# Patient Record
Sex: FEMALE | Race: WHITE | URBAN - METROPOLITAN AREA
[De-identification: names, ages, dates, MRNs, and addresses within clinical notes are randomized per-mention and may not be internally consistent; named-entity substitution may affect disease eponyms.]

---

## 2023-10-15 DIAGNOSIS — O36.80X0 PREGNANCY WITH INCONCLUSIVE FETAL VIABILITY, SINGLE OR UNSPECIFIED FETUS: Primary | ICD-10-CM

## 2023-11-17 ENCOUNTER — ULTRASOUND ENCOUNTER (OUTPATIENT)
Facility: CLINIC | Age: 23
End: 2023-11-17
Payer: MEDICAID

## 2023-11-17 ENCOUNTER — TELEPHONE (OUTPATIENT)
Facility: CLINIC | Age: 23
End: 2023-11-17

## 2023-11-17 ENCOUNTER — LAB ENCOUNTER (OUTPATIENT)
Dept: LAB | Age: 23
End: 2023-11-17
Attending: OBSTETRICS & GYNECOLOGY
Payer: MEDICAID

## 2023-11-17 ENCOUNTER — INITIAL PRENATAL (OUTPATIENT)
Facility: CLINIC | Age: 23
End: 2023-11-17
Payer: MEDICAID

## 2023-11-17 VITALS
BODY MASS INDEX: 31.78 KG/M2 | HEART RATE: 76 BPM | WEIGHT: 157.63 LBS | HEIGHT: 59 IN | SYSTOLIC BLOOD PRESSURE: 112 MMHG | DIASTOLIC BLOOD PRESSURE: 72 MMHG

## 2023-11-17 DIAGNOSIS — Z34.81 PRENATAL CARE, SUBSEQUENT PREGNANCY IN FIRST TRIMESTER: ICD-10-CM

## 2023-11-17 DIAGNOSIS — O36.80X0 PREGNANCY WITH INCONCLUSIVE FETAL VIABILITY, SINGLE OR UNSPECIFIED FETUS: Primary | ICD-10-CM

## 2023-11-17 DIAGNOSIS — Z12.4 CERVICAL CANCER SCREENING: ICD-10-CM

## 2023-11-17 DIAGNOSIS — Z3A.10 10 WEEKS GESTATION OF PREGNANCY: ICD-10-CM

## 2023-11-17 PROBLEM — O30.049 DICHORIONIC DIAMNIOTIC TWIN PREGNANCY, ANTEPARTUM: Status: ACTIVE | Noted: 2023-11-17

## 2023-11-17 PROBLEM — O99.210 OBESITY AFFECTING PREGNANCY, ANTEPARTUM: Status: ACTIVE | Noted: 2023-11-17

## 2023-11-17 PROBLEM — O30.049 DICHORIONIC DIAMNIOTIC TWIN PREGNANCY, ANTEPARTUM (HCC): Status: ACTIVE | Noted: 2023-11-17

## 2023-11-17 PROBLEM — O99.210 OBESITY AFFECTING PREGNANCY, ANTEPARTUM (HCC): Status: ACTIVE | Noted: 2023-11-17

## 2023-11-17 LAB
ALBUMIN SERPL-MCNC: 3.4 G/DL (ref 3.4–5)
ALBUMIN/GLOB SERPL: 0.9 {RATIO} (ref 1–2)
ALP LIVER SERPL-CCNC: 83 U/L
ALT SERPL-CCNC: 15 U/L
ANION GAP SERPL CALC-SCNC: 7 MMOL/L (ref 0–18)
ANTIBODY SCREEN: NEGATIVE
APPEARANCE: CLEAR
AST SERPL-CCNC: 10 U/L (ref 15–37)
BASOPHILS # BLD AUTO: 0.03 X10(3) UL (ref 0–0.2)
BASOPHILS NFR BLD AUTO: 0.3 %
BILIRUB SERPL-MCNC: 0.2 MG/DL (ref 0.1–2)
BILIRUBIN: NEGATIVE
BUN BLD-MCNC: 6 MG/DL (ref 9–23)
CALCIUM BLD-MCNC: 8.7 MG/DL (ref 8.5–10.1)
CHLORIDE SERPL-SCNC: 108 MMOL/L (ref 98–112)
CO2 SERPL-SCNC: 23 MMOL/L (ref 21–32)
CREAT BLD-MCNC: 0.64 MG/DL
EGFRCR SERPLBLD CKD-EPI 2021: 126 ML/MIN/1.73M2 (ref 60–?)
EOSINOPHIL # BLD AUTO: 0.03 X10(3) UL (ref 0–0.7)
EOSINOPHIL NFR BLD AUTO: 0.3 %
ERYTHROCYTE [DISTWIDTH] IN BLOOD BY AUTOMATED COUNT: 13.4 %
GLOBULIN PLAS-MCNC: 4 G/DL (ref 2.8–4.4)
GLUCOSE (URINE DIPSTICK): NEGATIVE MG/DL
GLUCOSE BLD-MCNC: 78 MG/DL (ref 70–99)
HBV SURFACE AG SER-ACNC: <0.1 [IU]/L
HBV SURFACE AG SERPL QL IA: NONREACTIVE
HCT VFR BLD AUTO: 42.6 %
HCV AB SERPL QL IA: NONREACTIVE
HGB BLD-MCNC: 14.3 G/DL
IMM GRANULOCYTES # BLD AUTO: 0.03 X10(3) UL (ref 0–1)
IMM GRANULOCYTES NFR BLD: 0.3 %
KETONES (URINE DIPSTICK): NEGATIVE MG/DL
LEUKOCYTES: NEGATIVE
LYMPHOCYTES # BLD AUTO: 2.47 X10(3) UL (ref 1–4)
LYMPHOCYTES NFR BLD AUTO: 26.2 %
MCH RBC QN AUTO: 29.5 PG (ref 26–34)
MCHC RBC AUTO-ENTMCNC: 33.6 G/DL (ref 31–37)
MCV RBC AUTO: 88 FL
MONOCYTES # BLD AUTO: 0.62 X10(3) UL (ref 0.1–1)
MONOCYTES NFR BLD AUTO: 6.6 %
MULTISTIX LOT#: NORMAL NUMERIC
NEUTROPHILS # BLD AUTO: 6.23 X10 (3) UL (ref 1.5–7.7)
NEUTROPHILS # BLD AUTO: 6.23 X10(3) UL (ref 1.5–7.7)
NEUTROPHILS NFR BLD AUTO: 66.3 %
NITRITE, URINE: NEGATIVE
OCCULT BLOOD: NEGATIVE
OSMOLALITY SERPL CALC.SUM OF ELEC: 282 MOSM/KG (ref 275–295)
PH, URINE: 6.5 (ref 4.5–8)
PLATELET # BLD AUTO: 253 10(3)UL (ref 150–450)
POTASSIUM SERPL-SCNC: 4 MMOL/L (ref 3.5–5.1)
PROT SERPL-MCNC: 7.4 G/DL (ref 6.4–8.2)
PROTEIN (URINE DIPSTICK): NEGATIVE MG/DL
RBC # BLD AUTO: 4.84 X10(6)UL
RH BLOOD TYPE: POSITIVE
RUBV IGG SER QL: POSITIVE
RUBV IGG SER-ACNC: 59.1 IU/ML (ref 10–?)
SODIUM SERPL-SCNC: 138 MMOL/L (ref 136–145)
SPECIFIC GRAVITY: 1.02 (ref 1–1.03)
T PALLIDUM AB SER QL IA: NONREACTIVE
URINE-COLOR: YELLOW
UROBILINOGEN,SEMI-QN: 0.2 MG/DL (ref 0–1.9)
WBC # BLD AUTO: 9.4 X10(3) UL (ref 4–11)

## 2023-11-17 PROCEDURE — 3008F BODY MASS INDEX DOCD: CPT | Performed by: OBSTETRICS & GYNECOLOGY

## 2023-11-17 PROCEDURE — 87491 CHLMYD TRACH DNA AMP PROBE: CPT | Performed by: OBSTETRICS & GYNECOLOGY

## 2023-11-17 PROCEDURE — 87086 URINE CULTURE/COLONY COUNT: CPT | Performed by: OBSTETRICS & GYNECOLOGY

## 2023-11-17 PROCEDURE — 86762 RUBELLA ANTIBODY: CPT

## 2023-11-17 PROCEDURE — 87389 HIV-1 AG W/HIV-1&-2 AB AG IA: CPT

## 2023-11-17 PROCEDURE — 0500F INITIAL PRENATAL CARE VISIT: CPT | Performed by: OBSTETRICS & GYNECOLOGY

## 2023-11-17 PROCEDURE — 87077 CULTURE AEROBIC IDENTIFY: CPT | Performed by: OBSTETRICS & GYNECOLOGY

## 2023-11-17 PROCEDURE — 86803 HEPATITIS C AB TEST: CPT

## 2023-11-17 PROCEDURE — 87591 N.GONORRHOEAE DNA AMP PROB: CPT | Performed by: OBSTETRICS & GYNECOLOGY

## 2023-11-17 PROCEDURE — 88175 CYTOPATH C/V AUTO FLUID REDO: CPT | Performed by: OBSTETRICS & GYNECOLOGY

## 2023-11-17 PROCEDURE — 86850 RBC ANTIBODY SCREEN: CPT

## 2023-11-17 PROCEDURE — 36415 COLL VENOUS BLD VENIPUNCTURE: CPT

## 2023-11-17 PROCEDURE — 76801 OB US < 14 WKS SINGLE FETUS: CPT | Performed by: OBSTETRICS & GYNECOLOGY

## 2023-11-17 PROCEDURE — 3078F DIAST BP <80 MM HG: CPT | Performed by: OBSTETRICS & GYNECOLOGY

## 2023-11-17 PROCEDURE — 86901 BLOOD TYPING SEROLOGIC RH(D): CPT

## 2023-11-17 PROCEDURE — 83036 HEMOGLOBIN GLYCOSYLATED A1C: CPT

## 2023-11-17 PROCEDURE — 86900 BLOOD TYPING SEROLOGIC ABO: CPT

## 2023-11-17 PROCEDURE — 86780 TREPONEMA PALLIDUM: CPT

## 2023-11-17 PROCEDURE — 87186 SC STD MICRODIL/AGAR DIL: CPT | Performed by: OBSTETRICS & GYNECOLOGY

## 2023-11-17 PROCEDURE — 85025 COMPLETE CBC W/AUTO DIFF WBC: CPT

## 2023-11-17 PROCEDURE — 3074F SYST BP LT 130 MM HG: CPT | Performed by: OBSTETRICS & GYNECOLOGY

## 2023-11-17 PROCEDURE — 80053 COMPREHEN METABOLIC PANEL: CPT

## 2023-11-17 PROCEDURE — 87340 HEPATITIS B SURFACE AG IA: CPT

## 2023-11-17 PROCEDURE — 81002 URINALYSIS NONAUTO W/O SCOPE: CPT | Performed by: OBSTETRICS & GYNECOLOGY

## 2023-11-17 RX ORDER — CHOLECALCIFEROL (VITAMIN D3) 25 MCG
1 TABLET,CHEWABLE ORAL DAILY
COMMUNITY

## 2023-11-17 NOTE — PROGRESS NOTES
New OB  - patient has no complaints, denies h/o HSV, blood transfusion, hepatitis  - EDC by LMP c/w 10w1d US, planned pregnancy  - NIPT today    Di/Di twins  - L2U  - monthly growth 3rd trimester  - delivery 38 weeks    2.  Obesity  - discussed limit weight gain  - CMP, HbA1C ordered

## 2023-11-17 NOTE — TELEPHONE ENCOUNTER
8  Diamniotic dichorionic twin gestation    Pt request for NIPS lab draw per Dr. Phillip Raines     Patients name &  verified on lab tubes with patient. NIPS screen lab drawn, patient tolerated well. Specimen placed at . [facility] access, call in 2 weeks for result, Cautioned patient may receive results via email from [facility] that includes gender results discussed. Patient verbalized understanding.

## 2023-11-18 LAB
EST. AVERAGE GLUCOSE BLD GHB EST-MCNC: 105 MG/DL (ref 68–126)
HBA1C MFR BLD: 5.3 % (ref ?–5.7)

## 2023-11-20 LAB
C TRACH DNA SPEC QL NAA+PROBE: NEGATIVE
N GONORRHOEA DNA SPEC QL NAA+PROBE: NEGATIVE

## 2023-11-21 LAB
AMB EXT MYRIAD TRISOMY 13: NEGATIVE
AMB EXT MYRIAD TRISOMY 18: NEGATIVE
AMB EXT MYRIAD TRISOMY 21: NEGATIVE

## 2023-11-21 RX ORDER — NITROFURANTOIN 25; 75 MG/1; MG/1
100 CAPSULE ORAL 2 TIMES DAILY
Qty: 14 CAPSULE | Refills: 0 | Status: SHIPPED | OUTPATIENT
Start: 2023-11-21 | End: 2023-11-28

## 2023-11-23 LAB
.: NORMAL
.: NORMAL

## 2023-12-07 ENCOUNTER — TELEPHONE (OUTPATIENT)
Facility: CLINIC | Age: 23
End: 2023-12-07

## 2023-12-07 NOTE — TELEPHONE ENCOUNTER
Spoke with pt. Aware NIPT is negative(normal). Note to [facility] to add gender. Verbalized understanding.

## 2023-12-14 ENCOUNTER — ROUTINE PRENATAL (OUTPATIENT)
Facility: CLINIC | Age: 23
End: 2023-12-14
Payer: MEDICAID

## 2023-12-14 VITALS
WEIGHT: 160 LBS | DIASTOLIC BLOOD PRESSURE: 61 MMHG | BODY MASS INDEX: 32.25 KG/M2 | SYSTOLIC BLOOD PRESSURE: 98 MMHG | HEIGHT: 59 IN | HEART RATE: 78 BPM

## 2023-12-14 DIAGNOSIS — O30.042 DICHORIONIC DIAMNIOTIC TWIN PREGNANCY IN SECOND TRIMESTER: Primary | ICD-10-CM

## 2023-12-14 PROCEDURE — 3008F BODY MASS INDEX DOCD: CPT

## 2023-12-14 PROCEDURE — 3078F DIAST BP <80 MM HG: CPT

## 2023-12-14 PROCEDURE — 3074F SYST BP LT 130 MM HG: CPT

## 2023-12-14 PROCEDURE — 99212 OFFICE O/P EST SF 10 MIN: CPT

## 2023-12-15 NOTE — PROGRESS NOTES
ROBIN 14w0d    She is doing well, no complaints    Twin A 143  Twin B 153     EDC by LMP c/w 10w1d US, planned pregnancy  - NIPT negative     Di/Di twins  - L2U-discussed with patient, ordered today. - monthly growth 3rd trimester  - delivery 38 weeks     2.  Obesity  - discussed limit weight gain  - CMP, HbA1C - results are normal.

## 2024-01-19 ENCOUNTER — ROUTINE PRENATAL (OUTPATIENT)
Dept: OBGYN CLINIC | Facility: CLINIC | Age: 24
End: 2024-01-19
Payer: MEDICAID

## 2024-01-19 VITALS
WEIGHT: 165.38 LBS | HEIGHT: 59 IN | BODY MASS INDEX: 33.34 KG/M2 | SYSTOLIC BLOOD PRESSURE: 109 MMHG | DIASTOLIC BLOOD PRESSURE: 71 MMHG | HEART RATE: 84 BPM

## 2024-01-19 DIAGNOSIS — O30.042 DICHORIONIC DIAMNIOTIC TWIN PREGNANCY IN SECOND TRIMESTER: Primary | ICD-10-CM

## 2024-01-19 PROCEDURE — 3078F DIAST BP <80 MM HG: CPT

## 2024-01-19 PROCEDURE — 3008F BODY MASS INDEX DOCD: CPT

## 2024-01-19 PROCEDURE — 99213 OFFICE O/P EST LOW 20 MIN: CPT

## 2024-01-19 PROCEDURE — 3074F SYST BP LT 130 MM HG: CPT

## 2024-01-19 NOTE — PROGRESS NOTES
ROBIN 19w1d    She is doing well, no complaints.     Twin A maternal right 145  Twin B maternal lower left 138     EDC by LMP c/w 10w1d US, planned pregnancy  - NIPT negative     Di/Di twins  - Q7T-maj appointment 1/25/24  - growth US,  NSTs, per [facility] recommendations  - delivery 38 weeks     2. Obesity  - discussed limit weight gain  - CMP, HbA1C - results are normal.   -L2US, growths, and NSTs

## 2024-01-24 NOTE — PROGRESS NOTES
Outpatient Maternal-Fetal Medicine Consultation     Dear Dr. Patricia     Thank you for requesting ultrasound evaluation and maternal fetal medicine consultation on your patient Tana Kwong.  As you are aware she is a 23 year old female  with a twin pregnancy and an Estimated Date of Delivery: 24.  A maternal-fetal medicine consultation was requested secondary to  twin gestation .  Her prenatal records and labs were reviewed.     HISTORY  # 1 - Date: 10/10/15, Sex: Female, Weight: None, GA: 38w0d, Delivery: Normal spontaneous vaginal delivery, Apgar1: None, Apgar5: None, Living: Living, Birth Comments: None     # 2 - Date: 19, Sex: Female, Weight: None, GA: 38w0d, Delivery: Normal spontaneous vaginal delivery, Apgar1: None, Apgar5: None, Living: Living, Birth Comments: None     # 3 - Date: 22, Sex: Female, Weight: None, GA: 40w0d, Delivery: Normal spontaneous vaginal delivery, Apgar1: None, Apgar5: None, Living: Living, Birth Comments: None     # 4 - Date: None, Sex: None, Weight: None, GA: None, Delivery: None, Apgar1: None, Apgar5: None, Living: None, Birth Comments: None        Past Medical History  The patient  has no past medical history on file.     Past Surgical History  The patient  has no past surgical history on file.     Family History  The patient has no family status information on file.         Medications:   Current Outpatient Medications:     prenatal vitamin with DHA 27-0.8-228 MG Oral Cap, Take 1 capsule by mouth daily., Disp: , Rfl:   Allergies: No Known Allergies     PHYSICAL EXAMINATION:  /71 (BP Location: Right arm, Patient Position: Sitting, Cuff Size: adult)   Pulse 84   Ht 4' 11\" (1.499 m)   Wt 164 lb (74.4 kg)   LMP 2023 (Exact Date)   BMI 33.12 kg/m²   General: alert and oriented,no acute distress  Abdomen: gravid, soft, non-tender  Extremities: non-tender, no edema     OBSTETRIC ULTRASOUND     Twin A - IUP in transverse  presentation.    Placenta is posterior.   Cardiac activity is present, 141 bpm   g ( 0 lb 11 oz);   MVP is 6.6 cm.   SUB: 3VV, SVC/IVC    Twin A: The fetal measurements are consistent with the established EDC. No ultrasound evidence of structural abnormalities are seen today. The nasal bone is present. No ultrasound evidence of markers for aneuploidy are seen. She understands that ultrasound exam cannot exclude genetic abnormalities and that genetic testing is recommended. The limitations of ultrasound were discussed.    Twin B - IUP in transverse presentation.   Placenta is left lateral.  Cardiac activity is present, 144 bpm   g (0 lb 11 oz);   MVP is 5.6 cm.   SUB: 3 VV    Twin B:  The fetal measurements are consistent with the established EDC. No ultrasound evidence of structural abnormalities are seen today. The nasal bone is present. No ultrasound evidence of markers for aneuploidy are seen. She understands that ultrasound exam cannot exclude genetic abnormalities and that genetic testing is recommended. The limitations of ultrasound were discussed.    Discordance - 3.8 %    The cervix was not able to be clearly visualized and appeared short by transabdominal ultrasound, therefore transvaginal ultrasound was performed. Transvaginal US findings: Cervix is closed, long and no funneling seen measuring 49.8 mm      See PACS/Imaging Tab For Complete Ultrasound Report  I interpreted the results and reviewed them with the patient.     DISCUSSION  During her visit we discussed and reviewed the following issues:  DIAMNIOTIC DICHORIONIC TWIN GESTATION     Background:  Twins occur in approximately 1 in 80 in the [place].   Multiple pregnancies comprise an increasing proportion of the total pregnancies in the developed world due to older maternal age at childbirth and the expanded use of fertility treatments. In countries with high rates of multiple births, 30 to 50 percent of twin pregnancies occur  after infertility treatment.         Increased  Mortality of Twin Gestations    The mortality rate of infants is higher in multiple than ibrahim pregnancies because of the increased rates of prematurity, growth abnormalities, other obstetric complications, and congenital anomalies.  The incidence of single fetal death in twin pregnancies is 2.5 to 5.0 percent. In a retrospective study of 92 pregnancies, the incidence of single fetal death was much higher in monochorionic than diamniotic twins/triplets (26 versus 2.4 percent).      Birth with  Morbidity  The most serious risk of multiple gestations is spontaneous  delivery, which is associated with increased  mortality and short-term and long-term morbidity.  50% of twins are born  with average gestational age of delivery being 36 weeks.  The complications seen most frequently are hypothermia, respiratory abnormalities, patent ductus arteriosus, intracranial hemorrhage, hypoglycemia, necrotizing enterocolitis, infection, and retinopathy of prematurity.       IUGR  Growth restriction and discordant growth are frequent complications of multiple pregnancies and contribute to  morbidity and mortality.  IUGR is a major risk factor for increased  morbidity and mortality in twin pregnancies.        Congenital Anomalies  Higher rates of congenital anomalies contribute to adverse outcomes in multiple births. Monozygotic twins are two to three times more likely to have structural defects than dizygotic twins or singletons.     Psychosocial Stress Of Twin Gestations  First-time parents rarely appreciate the intensity of care that infants require. The emotional stress and complexity of caring for ibrahim newborns is magnified with multiples.  The risk of postpartum depression is increased.   Encourage assistance from additional caregivers such as other family members if available. Organizations of parents of  multiples may provide helpful coping strategies.        IMPRESSION:  IUP at 20w0d  Dichorionic diamniotic twin gestation     RECOMMENDATIONS:  Continue care with Dr. Patricia  Continue monthly growth ultrasounds.  Weekly NST's at 32 weeks.  Monitor for signs or symptoms of  labor, preeclampsia or fetal growth disturbances.  In the absence of other maternal or fetal indications, delivery advised at 38 weeks gestation.     Thank you for allowing me to participate in the care of your patient.  Please do not hesitate to contact me if additional questions or concerns arise.       Tyrel Lee M.D.     55 minutes spent in review of records, patient consultation, documentation and coordination of care.  The relevant clinical matter(s) are summarized above.      Note to patient and family  The 21st Century Cures Act makes medical notes available to patients in the interest of transparency.  However, please be advised that this is a medical document.  It is intended as rrgp-fr-nstc communication.  It is written and medical language may contain abbreviations or verbiage that are technical and unfamiliar.  It may appear blunt or direct.  Medical documents are intended to carry relevant information, facts as evident, and the clinical opinion of the practitioner.

## 2024-01-25 ENCOUNTER — ULTRASOUND ENCOUNTER (OUTPATIENT)
Dept: PERINATAL CARE | Facility: HOSPITAL | Age: 24
End: 2024-01-25
Attending: OBSTETRICS & GYNECOLOGY
Payer: MEDICAID

## 2024-01-25 ENCOUNTER — OFFICE VISIT (OUTPATIENT)
Dept: PERINATAL CARE | Facility: HOSPITAL | Age: 24
End: 2024-01-25
Payer: MEDICAID

## 2024-01-25 VITALS
HEIGHT: 59 IN | WEIGHT: 164 LBS | DIASTOLIC BLOOD PRESSURE: 71 MMHG | BODY MASS INDEX: 33.06 KG/M2 | SYSTOLIC BLOOD PRESSURE: 112 MMHG | HEART RATE: 84 BPM

## 2024-01-25 DIAGNOSIS — O30.049 TWIN GESTATION, DICHORIONIC DIAMNIOTIC: Primary | ICD-10-CM

## 2024-01-25 DIAGNOSIS — O30.042 DICHORIONIC DIAMNIOTIC TWIN PREGNANCY IN SECOND TRIMESTER: Primary | ICD-10-CM

## 2024-01-25 DIAGNOSIS — O30.042 DICHORIONIC DIAMNIOTIC TWIN PREGNANCY IN SECOND TRIMESTER: ICD-10-CM

## 2024-01-25 DIAGNOSIS — O30.049 TWIN GESTATION, DICHORIONIC DIAMNIOTIC: ICD-10-CM

## 2024-01-25 PROCEDURE — 76812 OB US DETAILED ADDL FETUS: CPT

## 2024-01-25 PROCEDURE — 76811 OB US DETAILED SNGL FETUS: CPT

## 2024-01-25 PROCEDURE — 76811 OB US DETAILED SNGL FETUS: CPT | Performed by: OBSTETRICS & GYNECOLOGY

## 2024-01-25 PROCEDURE — 76817 TRANSVAGINAL US OBSTETRIC: CPT

## 2024-01-25 NOTE — PROGRESS NOTES
Pt here for Level II Ultrasound/Di Di Twins  +fm both babies noted per patient  Pt denies complaints.

## 2024-02-16 ENCOUNTER — ROUTINE PRENATAL (OUTPATIENT)
Dept: OBGYN CLINIC | Facility: CLINIC | Age: 24
End: 2024-02-16
Payer: MEDICAID

## 2024-02-16 VITALS
HEIGHT: 59 IN | HEART RATE: 90 BPM | SYSTOLIC BLOOD PRESSURE: 101 MMHG | DIASTOLIC BLOOD PRESSURE: 66 MMHG | BODY MASS INDEX: 34.66 KG/M2 | WEIGHT: 171.94 LBS

## 2024-02-16 DIAGNOSIS — O30.042 DICHORIONIC DIAMNIOTIC TWIN PREGNANCY IN SECOND TRIMESTER: Primary | ICD-10-CM

## 2024-02-16 PROCEDURE — 99213 OFFICE O/P EST LOW 20 MIN: CPT

## 2024-02-16 NOTE — PROGRESS NOTES
ROBIN 23w1d    She is doing well, no complaints.   -1 hr GTT, CBC, 3rd tri HIV discussed - ordered today    Twin A fht 141 (maternal right)  Twin B fht 138 (maternal left)    EDC by LMP c/w 10w1d US, planned pregnancy  - NIPT negative     Di/Di twins  - L2U-normal anatomy x2, discordance 2.8%, cervix closed and no funneling noted.   - growth US monthly, NSTs starting at 32 wks, monitor for s/s of  labor, preeclampsia or fetal growth disturbances   - delivery 38 weeks     2. Obesity  - discussed limit weight gain  - CMP, HbA1C - results are normal.   -L2US, growths, and NSTs

## 2024-02-28 NOTE — PROGRESS NOTES
Outpatient Maternal-Fetal Medicine Follow-Up    Dear Dr. Patricia    Thank you for requesting ultrasound evaluation and maternal fetal medicine consultation on your patient Tana Kwong.  As you are aware she is a 24 year old female  with a twin pregnancy and an Estimated Date of Delivery: 24.  She returned to maternal-fetal medicine today for a follow-up visit.  Her history was reviewed from her prior visit and there were no interval changes.    Antepartum Risk Factors  Dichorionic diamniotic twin gestation     PHYSICAL EXAMINATION:  /67 (BP Location: Right arm, Patient Position: Sitting, Cuff Size: adult)   Pulse 79   Ht 4' 11\" (1.499 m)   Wt 174 lb (78.9 kg)   LMP 2023 (Exact Date)   BMI 35.14 kg/m²   General: alert and oriented, no acute distress  Abdomen: gravid, soft, non-tender  Extremities: non-tender, no edema    OBSTETRIC ULTRASOUND    Ultrasound findings:   Twin A - IUP in cephalic presentation.    Placenta is posterior.   Cardiac activity is present 143 bpm  EFW 1003 g ( 2 lb 3 oz); 65%.   MVP is 5.8 cm.     TWIN A: The fetal measurements are consistent with established EDC. No gross ultrasound evidence of structural abnormalities are seen today. The patient understands that ultrasound cannot rule out all structural and chromosomal abnormalities.    Twin B - IUP in transverse presentation.   Placenta is posterior.   Cardiac activity is present 124 bpm   g (1 lb 15 oz); 49%.   MVP is 4.7 cm.   TWIN B: The fetal measurements are consistent with established EDC. No gross ultrasound evidence of structural abnormalities are seen today. The patient understands that ultrasound cannot rule out all structural and chromosomal abnormalities.    Discordance - 12.6 %     See PACS/Imaging Tab For Complete Ultrasound Report  I interpreted the results and reviewed them with the patient.    DISCUSSION  During her visit we discussed and reviewed the following  issues:  DICHORIONIC DIAMNIOTIC TWIN GESTATION - follow-up  Appropriate interval growth is noted.  The patient denies any signs or symptoms of  labor. There is no clinical evidence of preeclampsia.  See follow-up recommendations below.    IMPRESSION:  IUP at 26w0d  Dichorionic diamniotic twin gestation     RECOMMENDATIONS:  Continue care with Dr. Patricia  Continue monthly growth ultrasounds.  Weekly NST's at 32 weeks.  Monitor for signs or symptoms of  labor, preeclampsia or fetal growth disturbances.  In the absence of other maternal or fetal indications, delivery advised at 38 weeks gestation.    Thank you for allowing me to participate in the care of your patient.  Please do not hesitate to contact me if additional questions or concerns arise.      Tyrel Lee M.D.    30 minutes spent in review of records, patient consultation, documentation and coordination of care.  The relevant clinical matter(s) are summarized above.     Note to patient and family  The 21st Century Cures Act makes medical notes available to patients in the interest of transparency.  However, please be advised that this is a medical document.  It is intended as emby-mx-qdcm communication.  It is written and medical language may contain abbreviations or verbiage that are technical and unfamiliar.  It may appear blunt or direct.  Medical documents are intended to carry relevant information, facts as evident, and the clinical opinion of the practitioner.

## 2024-03-07 ENCOUNTER — ULTRASOUND ENCOUNTER (OUTPATIENT)
Dept: PERINATAL CARE | Facility: HOSPITAL | Age: 24
End: 2024-03-07
Attending: OBSTETRICS & GYNECOLOGY
Payer: MEDICAID

## 2024-03-07 VITALS
SYSTOLIC BLOOD PRESSURE: 106 MMHG | BODY MASS INDEX: 35.08 KG/M2 | HEART RATE: 79 BPM | DIASTOLIC BLOOD PRESSURE: 67 MMHG | HEIGHT: 59 IN | WEIGHT: 174 LBS

## 2024-03-07 DIAGNOSIS — O30.049: ICD-10-CM

## 2024-03-07 DIAGNOSIS — O30.042 DICHORIONIC DIAMNIOTIC TWIN PREGNANCY IN SECOND TRIMESTER (HCC): Primary | ICD-10-CM

## 2024-03-07 DIAGNOSIS — O30.042 DICHORIONIC DIAMNIOTIC TWIN PREGNANCY IN SECOND TRIMESTER (HCC): ICD-10-CM

## 2024-03-07 DIAGNOSIS — O30.049: Primary | ICD-10-CM

## 2024-03-07 PROCEDURE — 76816 OB US FOLLOW-UP PER FETUS: CPT | Performed by: OBSTETRICS & GYNECOLOGY

## 2024-03-07 PROCEDURE — 76816 OB US FOLLOW-UP PER FETUS: CPT

## 2024-03-15 ENCOUNTER — LAB ENCOUNTER (OUTPATIENT)
Dept: LAB | Age: 24
End: 2024-03-15
Payer: MEDICAID

## 2024-03-15 ENCOUNTER — ROUTINE PRENATAL (OUTPATIENT)
Dept: OBGYN CLINIC | Facility: CLINIC | Age: 24
End: 2024-03-15
Payer: MEDICAID

## 2024-03-15 VITALS
DIASTOLIC BLOOD PRESSURE: 70 MMHG | SYSTOLIC BLOOD PRESSURE: 111 MMHG | HEIGHT: 59 IN | WEIGHT: 174.19 LBS | BODY MASS INDEX: 35.12 KG/M2 | HEART RATE: 83 BPM

## 2024-03-15 DIAGNOSIS — O30.042 DICHORIONIC DIAMNIOTIC TWIN PREGNANCY IN SECOND TRIMESTER (HCC): ICD-10-CM

## 2024-03-15 DIAGNOSIS — O30.042 DICHORIONIC DIAMNIOTIC TWIN PREGNANCY IN SECOND TRIMESTER (HCC): Primary | ICD-10-CM

## 2024-03-15 LAB
BASOPHILS # BLD AUTO: 0.02 X10(3) UL (ref 0–0.2)
BASOPHILS NFR BLD AUTO: 0.3 %
DEPRECATED HBV CORE AB SER IA-ACNC: 3.5 NG/ML
EOSINOPHIL # BLD AUTO: 0.03 X10(3) UL (ref 0–0.7)
EOSINOPHIL NFR BLD AUTO: 0.4 %
ERYTHROCYTE [DISTWIDTH] IN BLOOD BY AUTOMATED COUNT: 14.8 %
GLUCOSE 1H P GLC SERPL-MCNC: 129 MG/DL
HCT VFR BLD AUTO: 33.7 %
HGB BLD-MCNC: 10.6 G/DL
IMM GRANULOCYTES # BLD AUTO: 0.01 X10(3) UL (ref 0–1)
IMM GRANULOCYTES NFR BLD: 0.1 %
LYMPHOCYTES # BLD AUTO: 1.93 X10(3) UL (ref 1–4)
LYMPHOCYTES NFR BLD AUTO: 26.2 %
MCH RBC QN AUTO: 26.2 PG (ref 26–34)
MCHC RBC AUTO-ENTMCNC: 31.5 G/DL (ref 31–37)
MCV RBC AUTO: 83.2 FL
MONOCYTES # BLD AUTO: 0.53 X10(3) UL (ref 0.1–1)
MONOCYTES NFR BLD AUTO: 7.2 %
NEUTROPHILS # BLD AUTO: 4.85 X10 (3) UL (ref 1.5–7.7)
NEUTROPHILS # BLD AUTO: 4.85 X10(3) UL (ref 1.5–7.7)
NEUTROPHILS NFR BLD AUTO: 65.8 %
PLATELET # BLD AUTO: 227 10(3)UL (ref 150–450)
RBC # BLD AUTO: 4.05 X10(6)UL
WBC # BLD AUTO: 7.4 X10(3) UL (ref 4–11)

## 2024-03-15 PROCEDURE — 87389 HIV-1 AG W/HIV-1&-2 AB AG IA: CPT

## 2024-03-15 PROCEDURE — 82950 GLUCOSE TEST: CPT

## 2024-03-15 PROCEDURE — 85025 COMPLETE CBC W/AUTO DIFF WBC: CPT

## 2024-03-15 PROCEDURE — 99214 OFFICE O/P EST MOD 30 MIN: CPT

## 2024-03-15 PROCEDURE — 82728 ASSAY OF FERRITIN: CPT

## 2024-03-21 NOTE — PROGRESS NOTES
Outpatient Maternal-Fetal Medicine Follow-Up     Dear Dr. Patricia     Thank you for requesting ultrasound evaluation and maternal fetal medicine consultation on your patient Tana Kwong.  As you are aware she is a 24 year old female  with a twin pregnancy and an Estimated Date of Delivery: 24.  She returned to maternal-fetal medicine today for a follow-up visit.  Her history was reviewed from her prior visit and there were no interval changes.     Antepartum Risk Factors  Dichorionic diamniotic twin gestation      PHYSICAL EXAMINATION:  /67 (BP Location: Right arm, Patient Position: Sitting, Cuff Size: adult)   Pulse 77   Ht 4' 11\" (1.499 m)   Wt 175 lb (79.4 kg)   LMP 2023 (Exact Date)   BMI 35.35 kg/m²   General: alert and oriented, no acute distress  Abdomen: gravid, soft, non-tender  Extremities: non-tender, no edema     OBSTETRIC ULTRASOUND     Ultrasound findings:   Twin A - IUP in transverse presentation.    Placenta is posterior.   Cardiac activity is present 156 bpm  EFW 1549 g ( 3 lb 7 oz); 55%.   MVP is 6 cm.   TWIN A: The fetal measurements are consistent with established EDC. No gross ultrasound evidence of structural abnormalities are seen today. The patient understands that ultrasound cannot rule out all structural and chromosomal abnormalities.    Twin B - IUP in transverse presentation.   Placenta is posterior.   Cardiac activity is present 142 bpm  EFW 1384 g (3 lb 1 oz); 40%.   MVP is 4.4 cm.   TWIN B: The fetal measurements are consistent with established EDC. No gross ultrasound evidence of structural abnormalities are seen today. The patient understands that ultrasound cannot rule out all structural and chromosomal abnormalities.    Discordance - 10.7 %      See PACS/Imaging Tab For Complete Ultrasound Report  I interpreted the results and reviewed them with the patient.     DISCUSSION  During her visit we discussed and reviewed the following  issues:  DICHORIONIC DIAMNIOTIC TWIN GESTATION - follow-up  Appropriate interval growth is noted.  The patient denies any signs or symptoms of  labor. There is no clinical evidence of preeclampsia.  See follow-up recommendations below.     IMPRESSION:  IUP at 30w0d  Dichorionic diamniotic twin gestation      RECOMMENDATIONS:  Continue care with Dr. Patricia  Continue monthly growth ultrasounds.  Weekly NST's at 32 weeks.  Monitor for signs or symptoms of  labor, preeclampsia or fetal growth disturbances.  In the absence of other maternal or fetal indications, delivery advised at 38 weeks gestation.     Thank you for allowing me to participate in the care of your patient.  Please do not hesitate to contact me if additional questions or concerns arise.       Tyrel Lee M.D.     30 minutes spent in review of records, patient consultation, documentation and coordination of care.  The relevant clinical matter(s) are summarized above.      Note to patient and family  The 21st Century Cures Act makes medical notes available to patients in the interest of transparency.  However, please be advised that this is a medical document.  It is intended as gwhf-gf-anjk communication.  It is written and medical language may contain abbreviations or verbiage that are technical and unfamiliar.  It may appear blunt or direct.  Medical documents are intended to carry relevant information, facts as evident, and the clinical opinion of the practitioner.

## 2024-04-04 ENCOUNTER — ULTRASOUND ENCOUNTER (OUTPATIENT)
Dept: PERINATAL CARE | Facility: HOSPITAL | Age: 24
End: 2024-04-04
Attending: OBSTETRICS & GYNECOLOGY
Payer: MEDICAID

## 2024-04-04 VITALS
DIASTOLIC BLOOD PRESSURE: 67 MMHG | BODY MASS INDEX: 35.28 KG/M2 | SYSTOLIC BLOOD PRESSURE: 107 MMHG | HEART RATE: 77 BPM | HEIGHT: 59 IN | WEIGHT: 175 LBS

## 2024-04-04 DIAGNOSIS — O30.043 DICHORIONIC DIAMNIOTIC TWIN PREGNANCY IN THIRD TRIMESTER (HCC): Primary | ICD-10-CM

## 2024-04-04 DIAGNOSIS — O30.043 DICHORIONIC DIAMNIOTIC TWIN PREGNANCY IN THIRD TRIMESTER (HCC): ICD-10-CM

## 2024-04-04 PROCEDURE — 76816 OB US FOLLOW-UP PER FETUS: CPT | Performed by: OBSTETRICS & GYNECOLOGY

## 2024-04-07 PROBLEM — D50.9 MATERNAL IRON DEFICIENCY ANEMIA AFFECTING PREGNANCY IN THIRD TRIMESTER, ANTEPARTUM (HCC): Status: ACTIVE | Noted: 2024-04-07

## 2024-04-07 PROBLEM — O30.043 DICHORIONIC DIAMNIOTIC TWIN PREGNANCY IN THIRD TRIMESTER (HCC): Status: ACTIVE | Noted: 2023-11-17

## 2024-04-07 PROBLEM — E66.3 OVERWEIGHT (BMI 25.0-29.9): Status: ACTIVE | Noted: 2023-11-17

## 2024-04-07 PROBLEM — O99.013 MATERNAL IRON DEFICIENCY ANEMIA AFFECTING PREGNANCY IN THIRD TRIMESTER, ANTEPARTUM (HCC): Status: ACTIVE | Noted: 2024-04-07

## 2024-04-10 ENCOUNTER — TELEPHONE (OUTPATIENT)
Facility: CLINIC | Age: 24
End: 2024-04-10

## 2024-04-10 NOTE — TELEPHONE ENCOUNTER
Pt canceled her appointment on mychart for ROBIN. 4/8/24.   LVM for pt to please call to schedule appointment. Please advise.

## 2024-04-11 NOTE — TELEPHONE ENCOUNTER
Merlin with pt. Rescheduled ROBIN appointment for 4/17/24 with Dr. Rojas. Verbalized understanding.

## 2024-04-17 ENCOUNTER — ROUTINE PRENATAL (OUTPATIENT)
Facility: CLINIC | Age: 24
End: 2024-04-17
Payer: MEDICAID

## 2024-04-17 VITALS
HEIGHT: 59 IN | HEART RATE: 68 BPM | WEIGHT: 176.81 LBS | BODY MASS INDEX: 35.64 KG/M2 | DIASTOLIC BLOOD PRESSURE: 64 MMHG | SYSTOLIC BLOOD PRESSURE: 104 MMHG

## 2024-04-17 DIAGNOSIS — O99.013 MATERNAL IRON DEFICIENCY ANEMIA AFFECTING PREGNANCY IN THIRD TRIMESTER, ANTEPARTUM (HCC): ICD-10-CM

## 2024-04-17 DIAGNOSIS — O30.043 DICHORIONIC DIAMNIOTIC TWIN PREGNANCY IN THIRD TRIMESTER (HCC): Primary | ICD-10-CM

## 2024-04-17 DIAGNOSIS — Z23 NEED FOR TDAP VACCINATION: ICD-10-CM

## 2024-04-17 DIAGNOSIS — E66.3 OVERWEIGHT (BMI 25.0-29.9): ICD-10-CM

## 2024-04-17 DIAGNOSIS — D50.9 MATERNAL IRON DEFICIENCY ANEMIA AFFECTING PREGNANCY IN THIRD TRIMESTER, ANTEPARTUM (HCC): ICD-10-CM

## 2024-04-17 PROCEDURE — 99214 OFFICE O/P EST MOD 30 MIN: CPT | Performed by: OBSTETRICS & GYNECOLOGY

## 2024-04-17 PROCEDURE — 59025 FETAL NON-STRESS TEST: CPT | Performed by: OBSTETRICS & GYNECOLOGY

## 2024-04-17 NOTE — PROGRESS NOTES
ROBIN    +FM Doing well. No complaints     24 year old  at 31w6d  ANA MARIA 24 by LMP c/w 10w1d US  O+    -NIPS negative  -1 hr GTT & 3rd trim HIV done  -Tdap - encouraged. Can't give it to her in clinic due to Medicaid so will need to go to [facility] department.   -classes, pre-registration, pediatrician, breast pump, car seats discussed     Di/Di twins  -L2 US - normal   -Growth US monthly   3/7 - 26 wk 65%/49%. Discordance 12.6%  / - 30 wk: transverse 55%/transverse 40%. Discordance 10.7%  Next appt  - ordered already   -NSTs weekly at 32 wk  -Discussed overview of route of delivery planning with twin gestation including possible need for breech extraction for 2nd twin in vaginal delivery. Patient would like to attempt vaginal delivery only if both twins are vtx/vtx. If other presentation, would prefer CS.   -delivery at 38 wk      2. Overweight (pre preg BMI 29.9)   - weight gain goal 28-42 lb with twins  - baseline CMP, HbA1c - normal    3. Iron deficiency anemia  -23 Hb 14.3, ferritin 14.3 = initial prenatal labs   -3/15/24 Hb 10.6, ferritin 3.5   -PNV containing iron? Y  -Extra oral iron? Has been taking M,W,F    -can recheck Hb 1 month after starting extra oral iron or proceed to IV iron now. Because of twins & increased risk for  labor & delivery, recommend IV iron now.      RTC 1 wk with NST. Message to  staff sent.

## 2024-04-23 ENCOUNTER — TELEPHONE (OUTPATIENT)
Facility: CLINIC | Age: 24
End: 2024-04-23

## 2024-04-23 NOTE — TELEPHONE ENCOUNTER
Pt canceled NST appt on [employer] for today 04/23/2024, could not get in touch with pt to r/s. Pt does not have any future appts scheduled.

## 2024-04-24 NOTE — TELEPHONE ENCOUNTER
Attempted to call pt to reschedule missed ROBIN appointment. Unable to leave a message. No voicemail.

## 2024-04-25 NOTE — PROGRESS NOTES
Outpatient Maternal-Fetal Medicine Follow-Up     Dear Dr. Patricia     Thank you for requesting ultrasound evaluation and maternal fetal medicine consultation on your patient Tana Kwong.  As you are aware she is a 24 year old female  with a twin pregnancy and an Estimated Date of Delivery: 24.  She returned to maternal-fetal medicine today for a follow-up visit.  Her history was reviewed from her prior visit and there were no interval changes.     Antepartum Risk Factors  Dichorionic diamniotic twin gestation      PHYSICAL EXAMINATION:  /72 (BP Location: Right arm, Patient Position: Sitting, Cuff Size: adult)   Pulse 84   Ht 4' 11\" (1.499 m)   Wt 178 lb (80.7 kg)   LMP 2023 (Exact Date)   BMI 35.95 kg/m²   General: alert and oriented, no acute distress  Abdomen: gravid, soft, non-tender  Extremities: non-tender, no edema     OBSTETRIC ULTRASOUND     Ultrasound findings:   Twin A - IUP in breech presentation.    Placenta is posterior.   Cardiac activity is present, 122 bpm  EFW 2350 g ( 5 lb 3 oz); 47%.   MVP is 6.6 cm. BPP is 8/8.      TWIN A: The fetal measurements are consistent with established EDC. No gross ultrasound evidence of structural abnormalities are seen today. The patient understands that ultrasound cannot rule out all structural and chromosomal abnormalities.    Twin B - IUP in transverse presentation.   Placenta is posterior.  Cardiac activity is present, 134 bpm  EFW 2064 g (4 lb 9 oz); 26%.   MVP is 7 cm. BPP is 8/8.      TWIN B: The fetal measurements are consistent with established EDC. No gross ultrasound evidence of structural abnormalities are seen today. The patient understands that ultrasound cannot rule out all structural and chromosomal abnormalities.    Discordance - 12.1 %     See PACS/Imaging Tab For Complete Ultrasound Report  I interpreted the results and reviewed them with the patient.     DISCUSSION  During her visit we discussed  and reviewed the following issues:  DICHORIONIC DIAMNIOTIC TWIN GESTATION - follow-up  Appropriate interval growth is noted.  The patient denies any signs or symptoms of  labor. There is no clinical evidence of preeclampsia.  See follow-up recommendations below.     IMPRESSION:  IUP at 34w0d  Dichorionic diamniotic twin gestation      RECOMMENDATIONS:  Continue care with Dr. Patricia  Continue monthly growth ultrasounds.  Weekly NST's at 32 weeks.  Monitor for signs or symptoms of  labor, preeclampsia or fetal growth disturbances.  In the absence of other maternal or fetal indications, delivery advised at 38 weeks gestation.     Thank you for allowing me to participate in the care of your patient.  Please do not hesitate to contact me if additional questions or concerns arise.       Tyrel Lee M.D.     30 minutes spent in review of records, patient consultation, documentation and coordination of care.  The relevant clinical matter(s) are summarized above.      Note to patient and family  The 21st Century Cures Act makes medical notes available to patients in the interest of transparency.  However, please be advised that this is a medical document.  It is intended as cnrq-vi-pkba communication.  It is written and medical language may contain abbreviations or verbiage that are technical and unfamiliar.  It may appear blunt or direct.  Medical documents are intended to carry relevant information, facts as evident, and the clinical opinion of the practitioner.

## 2024-04-30 NOTE — TELEPHONE ENCOUNTER
ROBIN appt rescheduled for tomm 0900 EP. Patient verbalized understanding, agreed to and intend to comply with plan of care.

## 2024-05-02 ENCOUNTER — ULTRASOUND ENCOUNTER (OUTPATIENT)
Dept: PERINATAL CARE | Facility: HOSPITAL | Age: 24
End: 2024-05-02
Attending: OBSTETRICS & GYNECOLOGY
Payer: MEDICAID

## 2024-05-02 ENCOUNTER — LAB ENCOUNTER (OUTPATIENT)
Dept: LAB | Age: 24
End: 2024-05-02
Attending: STUDENT IN AN ORGANIZED HEALTH CARE EDUCATION/TRAINING PROGRAM
Payer: MEDICAID

## 2024-05-02 ENCOUNTER — ROUTINE PRENATAL (OUTPATIENT)
Dept: OBGYN CLINIC | Facility: CLINIC | Age: 24
End: 2024-05-02
Payer: MEDICAID

## 2024-05-02 VITALS
BODY MASS INDEX: 35.88 KG/M2 | DIASTOLIC BLOOD PRESSURE: 72 MMHG | SYSTOLIC BLOOD PRESSURE: 104 MMHG | HEART RATE: 84 BPM | HEIGHT: 59 IN | WEIGHT: 178 LBS

## 2024-05-02 VITALS
BODY MASS INDEX: 36 KG/M2 | HEART RATE: 96 BPM | WEIGHT: 178.56 LBS | DIASTOLIC BLOOD PRESSURE: 76 MMHG | SYSTOLIC BLOOD PRESSURE: 108 MMHG | HEIGHT: 59 IN

## 2024-05-02 DIAGNOSIS — O30.049: ICD-10-CM

## 2024-05-02 DIAGNOSIS — O30.043 DICHORIONIC DIAMNIOTIC TWIN PREGNANCY IN THIRD TRIMESTER (HCC): Primary | ICD-10-CM

## 2024-05-02 DIAGNOSIS — O30.043 DICHORIONIC DIAMNIOTIC TWIN PREGNANCY IN THIRD TRIMESTER (HCC): ICD-10-CM

## 2024-05-02 DIAGNOSIS — O30.049: Primary | ICD-10-CM

## 2024-05-02 DIAGNOSIS — O99.013 MATERNAL IRON DEFICIENCY ANEMIA AFFECTING PREGNANCY IN THIRD TRIMESTER, ANTEPARTUM (HCC): ICD-10-CM

## 2024-05-02 DIAGNOSIS — O99.210 OBESITY AFFECTING PREGNANCY (HCC): ICD-10-CM

## 2024-05-02 DIAGNOSIS — D50.9 MATERNAL IRON DEFICIENCY ANEMIA AFFECTING PREGNANCY IN THIRD TRIMESTER, ANTEPARTUM (HCC): ICD-10-CM

## 2024-05-02 DIAGNOSIS — Z34.90 PRENATAL CARE, ANTEPARTUM (HCC): ICD-10-CM

## 2024-05-02 LAB
BASOPHILS # BLD AUTO: 0.02 X10(3) UL (ref 0–0.2)
BASOPHILS NFR BLD AUTO: 0.3 %
EOSINOPHIL # BLD AUTO: 0.03 X10(3) UL (ref 0–0.7)
EOSINOPHIL NFR BLD AUTO: 0.5 %
ERYTHROCYTE [DISTWIDTH] IN BLOOD BY AUTOMATED COUNT: 17.7 %
HCT VFR BLD AUTO: 34.7 %
HGB BLD-MCNC: 10.5 G/DL
IMM GRANULOCYTES # BLD AUTO: 0.02 X10(3) UL (ref 0–1)
IMM GRANULOCYTES NFR BLD: 0.3 %
LYMPHOCYTES # BLD AUTO: 1.72 X10(3) UL (ref 1–4)
LYMPHOCYTES NFR BLD AUTO: 27 %
MCH RBC QN AUTO: 24 PG (ref 26–34)
MCHC RBC AUTO-ENTMCNC: 30.3 G/DL (ref 31–37)
MCV RBC AUTO: 79.2 FL
MONOCYTES # BLD AUTO: 0.53 X10(3) UL (ref 0.1–1)
MONOCYTES NFR BLD AUTO: 8.3 %
NEUTROPHILS # BLD AUTO: 4.05 X10 (3) UL (ref 1.5–7.7)
NEUTROPHILS # BLD AUTO: 4.05 X10(3) UL (ref 1.5–7.7)
NEUTROPHILS NFR BLD AUTO: 63.6 %
PLATELET # BLD AUTO: 195 10(3)UL (ref 150–450)
RBC # BLD AUTO: 4.38 X10(6)UL
T PALLIDUM AB SER QL IA: NONREACTIVE
WBC # BLD AUTO: 6.4 X10(3) UL (ref 4–11)

## 2024-05-02 PROCEDURE — 99212 OFFICE O/P EST SF 10 MIN: CPT | Performed by: STUDENT IN AN ORGANIZED HEALTH CARE EDUCATION/TRAINING PROGRAM

## 2024-05-02 PROCEDURE — 85025 COMPLETE CBC W/AUTO DIFF WBC: CPT

## 2024-05-02 PROCEDURE — 86780 TREPONEMA PALLIDUM: CPT

## 2024-05-02 PROCEDURE — 76816 OB US FOLLOW-UP PER FETUS: CPT | Performed by: OBSTETRICS & GYNECOLOGY

## 2024-05-02 PROCEDURE — 76819 FETAL BIOPHYS PROFIL W/O NST: CPT

## 2024-05-06 NOTE — PROGRESS NOTES
Message left to call back for results.    [facility] required for CPT codes  & [address]23. Ref #[id number]. Will fax order once signed.

## 2024-05-08 ENCOUNTER — TELEPHONE (OUTPATIENT)
Facility: CLINIC | Age: 24
End: 2024-05-08

## 2024-05-16 ENCOUNTER — TELEPHONE (OUTPATIENT)
Facility: CLINIC | Age: 24
End: 2024-05-16

## 2024-05-16 ENCOUNTER — HOSPITAL ENCOUNTER (INPATIENT)
Facility: HOSPITAL | Age: 24
LOS: 3 days | Discharge: HOME | End: 2024-05-19
Attending: OBSTETRICS & GYNECOLOGY | Admitting: OBSTETRICS & GYNECOLOGY

## 2024-05-16 ENCOUNTER — ANESTHESIA (OUTPATIENT)
Dept: OBGYN UNIT | Facility: HOSPITAL | Age: 24
End: 2024-05-16

## 2024-05-16 ENCOUNTER — ANESTHESIA EVENT (OUTPATIENT)
Dept: OBGYN UNIT | Facility: HOSPITAL | Age: 24
End: 2024-05-16

## 2024-05-16 DIAGNOSIS — Z98.891 STATUS POST PRIMARY LOW TRANSVERSE CESAREAN SECTION: Primary | ICD-10-CM

## 2024-05-16 PROBLEM — Z34.90 PREGNANCY (HCC): Status: ACTIVE | Noted: 2024-05-16

## 2024-05-16 LAB
BASOPHILS # BLD AUTO: 0.02 X10(3) UL (ref 0–0.2)
BASOPHILS NFR BLD AUTO: 0.3 %
EOSINOPHIL # BLD AUTO: 0.04 X10(3) UL (ref 0–0.7)
EOSINOPHIL NFR BLD AUTO: 0.6 %
ERYTHROCYTE [DISTWIDTH] IN BLOOD BY AUTOMATED COUNT: 18.2 %
HCT VFR BLD AUTO: 32.6 %
HGB BLD-MCNC: 10.1 G/DL
IMM GRANULOCYTES # BLD AUTO: 0.01 X10(3) UL (ref 0–1)
IMM GRANULOCYTES NFR BLD: 0.2 %
LYMPHOCYTES # BLD AUTO: 2.46 X10(3) UL (ref 1–4)
LYMPHOCYTES NFR BLD AUTO: 37.6 %
MCH RBC QN AUTO: 23.3 PG (ref 26–34)
MCHC RBC AUTO-ENTMCNC: 31 G/DL (ref 31–37)
MCV RBC AUTO: 75.3 FL
MONOCYTES # BLD AUTO: 0.46 X10(3) UL (ref 0.1–1)
MONOCYTES NFR BLD AUTO: 7 %
NEUTROPHILS # BLD AUTO: 3.55 X10 (3) UL (ref 1.5–7.7)
NEUTROPHILS # BLD AUTO: 3.55 X10(3) UL (ref 1.5–7.7)
NEUTROPHILS NFR BLD AUTO: 54.3 %
PLATELET # BLD AUTO: 190 10(3)UL (ref 150–450)
RBC # BLD AUTO: 4.33 X10(6)UL
WBC # BLD AUTO: 6.5 X10(3) UL (ref 4–11)

## 2024-05-16 PROCEDURE — 86900 BLOOD TYPING SEROLOGIC ABO: CPT | Performed by: STUDENT IN AN ORGANIZED HEALTH CARE EDUCATION/TRAINING PROGRAM

## 2024-05-16 PROCEDURE — 86780 TREPONEMA PALLIDUM: CPT | Performed by: STUDENT IN AN ORGANIZED HEALTH CARE EDUCATION/TRAINING PROGRAM

## 2024-05-16 PROCEDURE — 85025 COMPLETE CBC W/AUTO DIFF WBC: CPT | Performed by: STUDENT IN AN ORGANIZED HEALTH CARE EDUCATION/TRAINING PROGRAM

## 2024-05-16 PROCEDURE — 86850 RBC ANTIBODY SCREEN: CPT | Performed by: STUDENT IN AN ORGANIZED HEALTH CARE EDUCATION/TRAINING PROGRAM

## 2024-05-16 PROCEDURE — 86901 BLOOD TYPING SEROLOGIC RH(D): CPT | Performed by: STUDENT IN AN ORGANIZED HEALTH CARE EDUCATION/TRAINING PROGRAM

## 2024-05-16 PROCEDURE — 99214 OFFICE O/P EST MOD 30 MIN: CPT

## 2024-05-16 RX ORDER — CITRIC ACID/SODIUM CITRATE 334-500MG
30 SOLUTION, ORAL ORAL ONCE
Status: COMPLETED | OUTPATIENT
Start: 2024-05-16 | End: 2024-05-16

## 2024-05-16 RX ORDER — SODIUM CHLORIDE, SODIUM LACTATE, POTASSIUM CHLORIDE, CALCIUM CHLORIDE 600; 310; 30; 20 MG/100ML; MG/100ML; MG/100ML; MG/100ML
125 INJECTION, SOLUTION INTRAVENOUS CONTINUOUS
Status: DISCONTINUED | OUTPATIENT
Start: 2024-05-16 | End: 2024-05-17

## 2024-05-16 RX ORDER — ACETAMINOPHEN 500 MG
1000 TABLET ORAL ONCE
Status: COMPLETED | OUTPATIENT
Start: 2024-05-16 | End: 2024-05-16

## 2024-05-16 RX ORDER — ONDANSETRON 2 MG/ML
4 INJECTION INTRAMUSCULAR; INTRAVENOUS EVERY 6 HOURS PRN
Status: DISCONTINUED | OUTPATIENT
Start: 2024-05-16 | End: 2024-05-17

## 2024-05-17 PROBLEM — Z30.2 REQUEST FOR STERILIZATION: Status: ACTIVE | Noted: 2024-05-17

## 2024-05-17 PROBLEM — O42.919 PRETERM PREMATURE RUPTURE OF MEMBRANES (PPROM) DELIVERED, CURRENT HOSPITALIZATION (HCC): Status: ACTIVE | Noted: 2024-05-17

## 2024-05-17 PROBLEM — Z98.891 STATUS POST PRIMARY LOW TRANSVERSE CESAREAN SECTION: Status: ACTIVE | Noted: 2024-05-17

## 2024-05-17 LAB
ANTIBODY SCREEN: NEGATIVE
RH BLOOD TYPE: POSITIVE
T PALLIDUM AB SER QL IA: NONREACTIVE

## 2024-05-17 PROCEDURE — 88307 TISSUE EXAM BY PATHOLOGIST: CPT | Performed by: STUDENT IN AN ORGANIZED HEALTH CARE EDUCATION/TRAINING PROGRAM

## 2024-05-17 PROCEDURE — 0UB70ZZ EXCISION OF BILATERAL FALLOPIAN TUBES, OPEN APPROACH: ICD-10-PCS | Performed by: STUDENT IN AN ORGANIZED HEALTH CARE EDUCATION/TRAINING PROGRAM

## 2024-05-17 PROCEDURE — 88302 TISSUE EXAM BY PATHOLOGIST: CPT | Performed by: STUDENT IN AN ORGANIZED HEALTH CARE EDUCATION/TRAINING PROGRAM

## 2024-05-17 RX ORDER — HYDROMORPHONE HYDROCHLORIDE 1 MG/ML
0.4 INJECTION, SOLUTION INTRAMUSCULAR; INTRAVENOUS; SUBCUTANEOUS EVERY 2 HOUR PRN
Status: ACTIVE | OUTPATIENT
Start: 2024-05-17 | End: 2024-05-18

## 2024-05-17 RX ORDER — ONDANSETRON 2 MG/ML
4 INJECTION INTRAMUSCULAR; INTRAVENOUS EVERY 6 HOURS PRN
Status: DISCONTINUED | OUTPATIENT
Start: 2024-05-17 | End: 2024-05-19

## 2024-05-17 RX ORDER — KETOROLAC TROMETHAMINE 30 MG/ML
30 INJECTION, SOLUTION INTRAMUSCULAR; INTRAVENOUS EVERY 6 HOURS
Qty: 4 ML | Refills: 0 | Status: DISPENSED | OUTPATIENT
Start: 2024-05-17 | End: 2024-05-18

## 2024-05-17 RX ORDER — ACETAMINOPHEN 500 MG
1000 TABLET ORAL EVERY 6 HOURS
Status: DISCONTINUED | OUTPATIENT
Start: 2024-05-17 | End: 2024-05-19

## 2024-05-17 RX ORDER — DIPHENHYDRAMINE HYDROCHLORIDE 50 MG/ML
12.5 INJECTION INTRAMUSCULAR; INTRAVENOUS EVERY 4 HOURS PRN
Status: DISCONTINUED | OUTPATIENT
Start: 2024-05-17 | End: 2024-05-19

## 2024-05-17 RX ORDER — CARBOPROST TROMETHAMINE 250 UG/ML
250 INJECTION, SOLUTION INTRAMUSCULAR ONCE AS NEEDED
Status: ACTIVE | OUTPATIENT
Start: 2024-05-17 | End: 2024-05-17

## 2024-05-17 RX ORDER — SIMETHICONE 80 MG
80 TABLET,CHEWABLE ORAL 3 TIMES DAILY PRN
Status: DISCONTINUED | OUTPATIENT
Start: 2024-05-17 | End: 2024-05-19

## 2024-05-17 RX ORDER — MISOPROSTOL 200 UG/1
1000 TABLET ORAL ONCE AS NEEDED
Status: ACTIVE | OUTPATIENT
Start: 2024-05-17 | End: 2024-05-17

## 2024-05-17 RX ORDER — METOCLOPRAMIDE HYDROCHLORIDE 5 MG/ML
INJECTION INTRAMUSCULAR; INTRAVENOUS AS NEEDED
Status: DISCONTINUED | OUTPATIENT
Start: 2024-05-17 | End: 2024-05-17 | Stop reason: SURG

## 2024-05-17 RX ORDER — ONDANSETRON 2 MG/ML
4 INJECTION INTRAMUSCULAR; INTRAVENOUS ONCE AS NEEDED
Status: DISCONTINUED | OUTPATIENT
Start: 2024-05-17 | End: 2024-05-17 | Stop reason: HOSPADM

## 2024-05-17 RX ORDER — NALOXONE HYDROCHLORIDE 0.4 MG/ML
0.08 INJECTION, SOLUTION INTRAMUSCULAR; INTRAVENOUS; SUBCUTANEOUS
Status: ACTIVE | OUTPATIENT
Start: 2024-05-17 | End: 2024-05-18

## 2024-05-17 RX ORDER — MORPHINE SULFATE 2 MG/ML
INJECTION, SOLUTION INTRAMUSCULAR; INTRAVENOUS AS NEEDED
Status: DISCONTINUED | OUTPATIENT
Start: 2024-05-17 | End: 2024-05-17 | Stop reason: SURG

## 2024-05-17 RX ORDER — IBUPROFEN 600 MG/1
600 TABLET ORAL EVERY 6 HOURS
Status: DISCONTINUED | OUTPATIENT
Start: 2024-05-18 | End: 2024-05-19

## 2024-05-17 RX ORDER — DEXAMETHASONE SODIUM PHOSPHATE 4 MG/ML
VIAL (ML) INJECTION AS NEEDED
Status: DISCONTINUED | OUTPATIENT
Start: 2024-05-17 | End: 2024-05-17 | Stop reason: SURG

## 2024-05-17 RX ORDER — NALBUPHINE HYDROCHLORIDE 10 MG/ML
2.5 INJECTION, SOLUTION INTRAMUSCULAR; INTRAVENOUS; SUBCUTANEOUS EVERY 4 HOURS PRN
Status: DISCONTINUED | OUTPATIENT
Start: 2024-05-17 | End: 2024-05-19

## 2024-05-17 RX ORDER — DOCUSATE SODIUM 100 MG/1
100 CAPSULE, LIQUID FILLED ORAL
Status: DISCONTINUED | OUTPATIENT
Start: 2024-05-17 | End: 2024-05-19

## 2024-05-17 RX ORDER — KETOROLAC TROMETHAMINE 30 MG/ML
INJECTION, SOLUTION INTRAMUSCULAR; INTRAVENOUS
Status: COMPLETED
Start: 2024-05-17 | End: 2024-05-17

## 2024-05-17 RX ORDER — BUPIVACAINE HYDROCHLORIDE 7.5 MG/ML
INJECTION, SOLUTION INTRASPINAL AS NEEDED
Status: DISCONTINUED | OUTPATIENT
Start: 2024-05-17 | End: 2024-05-17 | Stop reason: SURG

## 2024-05-17 RX ORDER — HYDROMORPHONE HYDROCHLORIDE 1 MG/ML
0.4 INJECTION, SOLUTION INTRAMUSCULAR; INTRAVENOUS; SUBCUTANEOUS EVERY 5 MIN PRN
Status: DISCONTINUED | OUTPATIENT
Start: 2024-05-17 | End: 2024-05-17 | Stop reason: HOSPADM

## 2024-05-17 RX ORDER — DIPHENHYDRAMINE HCL 25 MG
25 CAPSULE ORAL EVERY 4 HOURS PRN
Status: DISCONTINUED | OUTPATIENT
Start: 2024-05-17 | End: 2024-05-19

## 2024-05-17 RX ORDER — TRANEXAMIC ACID 10 MG/ML
1000 INJECTION, SOLUTION INTRAVENOUS ONCE AS NEEDED
Status: ACTIVE | OUTPATIENT
Start: 2024-05-17 | End: 2024-05-17

## 2024-05-17 RX ORDER — GABAPENTIN 300 MG/1
300 CAPSULE ORAL EVERY 8 HOURS PRN
Status: DISCONTINUED | OUTPATIENT
Start: 2024-05-17 | End: 2024-05-19

## 2024-05-17 RX ORDER — ENOXAPARIN SODIUM 100 MG/ML
40 INJECTION SUBCUTANEOUS DAILY
Status: DISCONTINUED | OUTPATIENT
Start: 2024-05-17 | End: 2024-05-19

## 2024-05-17 RX ORDER — OXYCODONE HYDROCHLORIDE 5 MG/1
5 TABLET ORAL EVERY 4 HOURS PRN
Status: DISCONTINUED | OUTPATIENT
Start: 2024-05-17 | End: 2024-05-19

## 2024-05-17 RX ORDER — NALBUPHINE HYDROCHLORIDE 10 MG/ML
2.5 INJECTION, SOLUTION INTRAMUSCULAR; INTRAVENOUS; SUBCUTANEOUS
Status: DISCONTINUED | OUTPATIENT
Start: 2024-05-17 | End: 2024-05-17 | Stop reason: HOSPADM

## 2024-05-17 RX ORDER — BISACODYL 10 MG
10 SUPPOSITORY, RECTAL RECTAL ONCE AS NEEDED
Status: DISCONTINUED | OUTPATIENT
Start: 2024-05-17 | End: 2024-05-19

## 2024-05-17 RX ORDER — METHYLERGONOVINE MALEATE 0.2 MG/ML
0.2 INJECTION INTRAVENOUS ONCE AS NEEDED
Status: ACTIVE | OUTPATIENT
Start: 2024-05-17 | End: 2024-05-17

## 2024-05-17 RX ORDER — KETOROLAC TROMETHAMINE 30 MG/ML
30 INJECTION, SOLUTION INTRAMUSCULAR; INTRAVENOUS ONCE
Status: COMPLETED | OUTPATIENT
Start: 2024-05-17 | End: 2024-05-17

## 2024-05-17 RX ORDER — SODIUM CHLORIDE, SODIUM LACTATE, POTASSIUM CHLORIDE, CALCIUM CHLORIDE 600; 310; 30; 20 MG/100ML; MG/100ML; MG/100ML; MG/100ML
INJECTION, SOLUTION INTRAVENOUS CONTINUOUS
Status: DISCONTINUED | OUTPATIENT
Start: 2024-05-17 | End: 2024-05-19

## 2024-05-17 RX ORDER — ONDANSETRON 2 MG/ML
INJECTION INTRAMUSCULAR; INTRAVENOUS AS NEEDED
Status: DISCONTINUED | OUTPATIENT
Start: 2024-05-17 | End: 2024-05-17 | Stop reason: SURG

## 2024-05-17 RX ORDER — HYDROMORPHONE HYDROCHLORIDE 1 MG/ML
0.3 INJECTION, SOLUTION INTRAMUSCULAR; INTRAVENOUS; SUBCUTANEOUS EVERY 2 HOUR PRN
Status: DISCONTINUED | OUTPATIENT
Start: 2024-05-17 | End: 2024-05-19

## 2024-05-17 RX ORDER — HYDROMORPHONE HYDROCHLORIDE 1 MG/ML
0.2 INJECTION, SOLUTION INTRAMUSCULAR; INTRAVENOUS; SUBCUTANEOUS EVERY 5 MIN PRN
Status: DISCONTINUED | OUTPATIENT
Start: 2024-05-17 | End: 2024-05-17 | Stop reason: HOSPADM

## 2024-05-17 RX ORDER — DEXTROSE, SODIUM CHLORIDE, SODIUM LACTATE, POTASSIUM CHLORIDE, AND CALCIUM CHLORIDE 5; .6; .31; .03; .02 G/100ML; G/100ML; G/100ML; G/100ML; G/100ML
INJECTION, SOLUTION INTRAVENOUS CONTINUOUS PRN
Status: DISCONTINUED | OUTPATIENT
Start: 2024-05-17 | End: 2024-05-19

## 2024-05-17 RX ADMIN — DEXAMETHASONE SODIUM PHOSPHATE 4 MG: 4 MG/ML VIAL (ML) INJECTION at 00:24:00

## 2024-05-17 RX ADMIN — SODIUM CHLORIDE, SODIUM LACTATE, POTASSIUM CHLORIDE, CALCIUM CHLORIDE: 600; 310; 30; 20 INJECTION, SOLUTION INTRAVENOUS at 00:10:00

## 2024-05-17 RX ADMIN — METOCLOPRAMIDE HYDROCHLORIDE 10 MG: 5 INJECTION INTRAMUSCULAR; INTRAVENOUS at 00:24:00

## 2024-05-17 RX ADMIN — SODIUM CHLORIDE, SODIUM LACTATE, POTASSIUM CHLORIDE, CALCIUM CHLORIDE: 600; 310; 30; 20 INJECTION, SOLUTION INTRAVENOUS at 01:24:00

## 2024-05-17 RX ADMIN — ONDANSETRON 4 MG: 2 INJECTION INTRAMUSCULAR; INTRAVENOUS at 00:24:00

## 2024-05-17 RX ADMIN — BUPIVACAINE HYDROCHLORIDE 1.6 ML: 7.5 INJECTION, SOLUTION INTRASPINAL at 00:20:00

## 2024-05-17 RX ADMIN — MORPHINE SULFATE 0.2 MG: 2 INJECTION, SOLUTION INTRAMUSCULAR; INTRAVENOUS at 00:20:00

## 2024-05-17 NOTE — PLAN OF CARE
Problem: BIRTH - VAGINAL/ SECTION  Goal: Fetal and maternal status remain reassuring during the birth process  Description: INTERVENTIONS:  - Monitor vital signs  - Monitor fetal heart rate  - Monitor uterine activity  - Monitor labor progression (vaginal delivery)  - DVT prophylaxis (C/S delivery)  - Surgical antibiotic prophylaxis (C/S delivery)  Outcome: Progressing     Problem: PAIN - ADULT  Goal: Verbalizes/displays adequate comfort level or patient's stated pain goal  Description: INTERVENTIONS:  - Encourage pt to monitor pain and request assistance  - Assess pain using appropriate pain scale  - Administer analgesics based on type and severity of pain and evaluate response  - Implement non-pharmacological measures as appropriate and evaluate response  - Consider cultural and social influences on pain and pain management  - Manage/alleviate anxiety  - Utilize distraction and/or relaxation techniques  - Monitor for opioid side effects  - Notify MD/LIP if interventions unsuccessful or patient reports new pain  - Anticipate increased pain with activity and pre-medicate as appropriate  Outcome: Progressing     Problem: ANXIETY  Goal: Will report anxiety at manageable levels  Description: INTERVENTIONS:  - Administer medication as ordered  - Teach and rehearse alternative coping skills  - Provide emotional support with 1:1 interaction with staff  Outcome: Progressing     Problem: Patient/Family Goals  Goal: Patient/Family Long Term Goal  Description: Patient's Long Term Goal: adequate pain management    Interventions:  - follow prescribed POC  - See additional Care Plan goals for specific interventions  Outcome: Progressing  Goal: Patient/Family Short Term Goal  Description: Patient's Short Term Goal: safe surgical procedure    Interventions:   - follow prescribed POC  - See additional Care Plan goals for specific interventions  Outcome: Progressing

## 2024-05-17 NOTE — H&P
[facility]  Obstetrics and Gynecology  History & Physical    Tana Kwong Patient Status:  Inpatient    2/3/2000 MRN [id number]   Location [facility] LABOR & DELIVERY Attending Chanel Patricia DO   Hospital Day 0 PCP None Pcp     CC: Patient is here for PPROM, twins, plan for C section    SUBJECTIVE:    Tana Kwong is a 24 year old  female at 36w0d   Her current obstetrical history is significant for di/di twin pregnancy, fe deficiency anemia.     Membranes ruptured earlier this evening, pt not uncomfortable with contractions  Pt also is asking about permanent sterilization with C section. She is certain that she does not want to have any more children and would like bilateral salpingectomy      ANA MARIA Confirmation  LMP: Patient's last menstrual period was 2023 (exact date).  ANA MARIA: 2024, by Last Menstrual Period       Obstetric History:   OB History    Para Term  AB Living   4 3 3     3   SAB IAB Ectopic Multiple Live Births           3      # Outcome Date GA Lbr Jh/2nd Weight Sex Type Anes PTL Lv   4 Current            3 Term 22 40w0d   F NORMAL SPONT   JUAN   2 Term 19 38w0d   F NORMAL SPONT   JUAN   1 Term 10/10/15 38w0d   F NORMAL SPONT   JUAN     Past Medical History: History reviewed. No pertinent past medical history.  Past Social History: History reviewed. No pertinent surgical history.  Family History: No family history on file.  Social History:   Social History     Tobacco Use    Smoking status: Never    Smokeless tobacco: Never   Substance Use Topics    Alcohol use: Not Currently       Home Meds:   Medications Prior to Admission   Medication Sig Dispense Refill Last Dose    prenatal vitamin with DHA 27-0.8-228 MG Oral Cap Take 1 capsule by mouth daily.   2024     Allergies: No Known Allergies    OBJECTIVE:    Temp:  [98 °F (36.7 °C)] 98 °F (36.7 °C)  Pulse:  [80] 80  Resp:  [18] 18  BP: (123)/(73)  123/73  Body mass index is 34.74 kg/m².    General: NAD  FHT: twin A 120s/mod/+acc/no decels; twin B 130s/mod/+acc/no decels  TOCO: q 3-6 minutes    SVE: 4cm per RN  Grossly ruptured    Bedside US: Twin A breech, twin B transverse    Prenatal Labs Brief Review   Blood Type:   Lab Results   Component Value Date    ABO O 2023    RH Positive 2023     GBS:  N/A      Inpatient labs:  Lab Results   Component Value Date    WBC 6.5 2024    HGB 10.1 2024    HCT 32.6 2024    .0 2024       ASSESSMENT/ PLAN:    Tana Kwong is a 24 year old  female at 36w0d Estimated Date of Delivery: 24 who is being admitted for PPROM with di/di twin gestation. Presenting twin breech so recommended  delivery.    Patient would like permanent sterilization, discussed risks/benefits of bilateral salpingectomy along with primary C section, pt would like to proceed    Risks, benefits, alternatives and possible complications have been discussed in detail with the patient.  Pre-admission, admission, and post admission procedures and expectations were discussed in detail.  All questions answered, all appropriate consents will be signed at the Hospital. Admission is planned for today.    Siria Mcnulty MD

## 2024-05-17 NOTE — ANESTHESIA POSTPROCEDURE EVALUATION
[facility]    Tana Kwong Patient Status:  Inpatient   Age/Gender 24 year old female MRN [id number]   Location [facility] LABOR & DELIVERY Attending Chanel Patricia DO   Hosp Day # 1 PCP None Pcp       Anesthesia Post-op Note     SECTION with Bilateral Salpingectomy    Procedure Summary       Date: 24 Room / Location:  L+D OR   L+D OR    Anesthesia Start: 8 Anesthesia Stop: 124    Procedures:        SECTION with Bilateral Salpingectomy      TUBAL LIGATION Diagnosis:     Surgeons: Siria Mcnulty MD Anesthesiologist: Wilfred Anguiano MD    Anesthesia Type: spinal ASA Status: 2 - Emergent            Anesthesia Type: spinal    Vitals Value Taken Time   BP 97/64 24 0124   Temp 98 °F (36.7 °C) 24 0124   Pulse 88 24 0124   Resp 20 24 0124   SpO2 99 % 24 0124       Patient Location: Labor and Delivery    Anesthesia Type: spinal    Airway Patency: patent    Postop Pain Control: adequate    Mental Status: preanesthetic baseline    Nausea/Vomiting: none    Cardiopulmonary/Hydration status: stable euvolemic    Complications: no apparent anesthesia related complications    Postop vital signs: stable    Dental Exam: Unchanged from Preop    Patient to be discharged from PACU when criteria met.

## 2024-05-17 NOTE — PLAN OF CARE
Problem: BIRTH - VAGINAL/ SECTION  Goal: Fetal and maternal status remain reassuring during the birth process  Description: INTERVENTIONS:  - Monitor vital signs  - Monitor fetal heart rate  - Monitor uterine activity  - Monitor labor progression (vaginal delivery)  - DVT prophylaxis (C/S delivery)  - Surgical antibiotic prophylaxis (C/S delivery)  Outcome: Completed     Problem: PAIN - ADULT  Goal: Verbalizes/displays adequate comfort level or patient's stated pain goal  Description: INTERVENTIONS:  - Encourage pt to monitor pain and request assistance  - Assess pain using appropriate pain scale  - Administer analgesics based on type and severity of pain and evaluate response  - Implement non-pharmacological measures as appropriate and evaluate response  - Consider cultural and social influences on pain and pain management  - Manage/alleviate anxiety  - Utilize distraction and/or relaxation techniques  - Monitor for opioid side effects  - Notify MD/LIP if interventions unsuccessful or patient reports new pain  - Anticipate increased pain with activity and pre-medicate as appropriate  Outcome: Completed     Problem: ANXIETY  Goal: Will report anxiety at manageable levels  Description: INTERVENTIONS:  - Administer medication as ordered  - Teach and rehearse alternative coping skills  - Provide emotional support with 1:1 interaction with staff  Outcome: Completed     Problem: Patient/Family Goals  Goal: Patient/Family Long Term Goal  Description: Patient's Long Term Goal: adequate pain management    Interventions:  - follow prescribed POC  - See additional Care Plan goals for specific interventions  Outcome: Completed  Goal: Patient/Family Short Term Goal  Description: Patient's Short Term Goal: safe surgical procedure    Interventions:   - follow prescribed POC  - See additional Care Plan goals for specific interventions  Outcome: Completed

## 2024-05-17 NOTE — CM/SW NOTE
met with patient (Tana) to review insurance and PCP for infant. Mother would like infant  tw[place]. [facility] and did the add on to medicaid for twins. Tana is going to formula feed in and has WIC services. Patient will call WIC to make follow up appointment and get benefits updated. Patient has crib and car seat. Patient has no concerns with housing, food, utilities,or transportation. Patient had no other concerns at this time.

## 2024-05-17 NOTE — ANESTHESIA PROCEDURE NOTES
Spinal Block    Performed by: Wilfred Anguiano MD  Authorized by: Wilfred Anguiano MD      General Information and Staff    Start Time:   Anesthesiologist:  Wilfred Anguiano MD  Performed by:  Anesthesiologist  Site identification: surface landmarks    Preanesthetic Checklist: patient identified, IV checked, risks and benefits discussed, monitors and equipment checked, pre-op evaluation, timeout performed, anesthesia consent and sterile technique used      Procedure Details    Patient Position:  Sitting  Prep: ChloraPrep    Monitoring:  Cardiac monitor, heart rate and continuous pulse ox  Approach:  Midline  Location:  L3-4  Injection Technique:  Single-shot    Needle    Needle Type:  Sprotte  Needle Gauge:  24 G  Needle Length:  3.5 in    Assessment    Sensory Level:  T4  Events: clear CSF, CSF aspirated, well tolerated and blood negative      Additional Comments

## 2024-05-17 NOTE — PROGRESS NOTES
Patient admitted via cart.  ID bands verified.  Oriented to room.  Safety precautions are initiated.  Bed in low position.  Call light within reach.  IV infusing, placed on pump.  Will draining.   Patient instructed to remain in bed and call for assistance.

## 2024-05-17 NOTE — PROGRESS NOTES
Pt  TWIN preg, presents to L&D via ambulance presents to L&D with c/o large gush of clear fluid at 2220. Pt denies ucs, vag bleeding, states + fmx2. EFM tested and applied.

## 2024-05-17 NOTE — ANESTHESIA PREPROCEDURE EVALUATION
PRE-OP EVALUATION    Patient Name: Tana Kwong    Admit Diagnosis: pregnancy  Pregnancy (HCC)    Pre-op Diagnosis: TWIN PREGNANCY, BREECH/TRANSVERSE IN LABOR     SECTION    Anesthesia Procedure:  SECTION  TUBAL LIGATION    Surgeons and Role:     * Siria Mcnulty MD - Primary     * Veronica Barrera MD - Assisting Surgeon    Pre-op vitals reviewed.  Temp: 98 °F (36.7 °C)  Pulse: 80  Resp: 18  BP: 123/73     Body mass index is 34.74 kg/m².    Current medications reviewed.  Hospital Medications:   lactated ringers IV bolus 1,000 mL  1,000 mL Intravenous Once    Followed by    lactated ringers infusion  125 mL/hr Intravenous Continuous    [COMPLETED] acetaminophen (Tylenol Extra Strength) tab 1,000 mg  1,000 mg Oral Once    ondansetron (Zofran) 4 MG/2ML injection 4 mg  4 mg Intravenous Q6H PRN    [COMPLETED] sodium citrate-citric acid (Bicitra) 500-334 MG/5ML oral solution 30 mL  30 mL Oral Once    oxyTOCIN in sodium chloride 0.9% (Pitocin) 30 Units/500mL infusion premix  62.5-900 jayla-units/min Intravenous Continuous    ceFAZolin (Ancef) 2g in 10mL IV syringe premix  2 g Intravenous Once    azithromycin (Zithromax) 500 mg in sodium chloride 0.9% 250mL IVPB premix  500 mg Intravenous 30 Min Pre-Op       Outpatient Medications:     Medications Prior to Admission   Medication Sig Dispense Refill Last Dose    prenatal vitamin with DHA 27-0.8-228 MG Oral Cap Take 1 capsule by mouth daily.   2024       Allergies: Patient has no known allergies.      Anesthesia Evaluation    Patient summary reviewed.    Anesthetic Complications  (-) history of anesthetic complications         GI/Hepatic/Renal    Negative GI/hepatic/renal ROS.                             Cardiovascular        Exercise tolerance: good     MET: >4                                           Endo/Other               (+) anemia                   Pulmonary    Negative pulmonary ROS.                        Neuro/Psych    Negative neuro/psych ROS.                          Twins,36 weeks. Breech/transverse.        History reviewed. No pertinent surgical history.  Social History     Socioeconomic History    Marital status: Single   Tobacco Use    Smoking status: Never    Smokeless tobacco: Never   Substance and Sexual Activity    Alcohol use: Not Currently    Drug use: Never     History   Drug Use Unknown     Available pre-op labs reviewed.  Lab Results   Component Value Date    WBC 6.4 05/02/2024    RBC 4.38 05/02/2024    HGB 10.5 (L) 05/02/2024    HCT 34.7 (L) 05/02/2024    MCV 79.2 (L) 05/02/2024    MCH 24.0 (L) 05/02/2024    MCHC 30.3 (L) 05/02/2024    RDW 17.7 05/02/2024    .0 05/02/2024               Airway      Mallampati: II  Mouth opening: >3 FB  TM distance: 4 - 6 cm  Neck ROM: full Cardiovascular    Cardiovascular exam normal.         Dental             Pulmonary    Pulmonary exam normal.                 Other findings              ASA: 2 and emergent  Plan: spinal  NPO status verified and patient meets guidelines.          Plan/risks discussed with: patient                Present on Admission:  **None**

## 2024-05-17 NOTE — PROGRESS NOTES
Patient transferred to mother/baby room 2194 per cart in stable condition with babies x2 and personal belongings.  Accompanied by  and staff.  Report given to mother/baby RN.

## 2024-05-17 NOTE — TELEPHONE ENCOUNTER
On call MD    Pt is at work, she stood up and felt fluid leaking out of her  Then she sat down, and later the same thing happened  It is more fluid than would soak a pad  Advised to present to L&D to rule out PPROM

## 2024-05-17 NOTE — OPERATIVE REPORT
[facility]  Operative Note    Tana Kwong Patient Status:  Inpatient    2/3/2000 MRN [id number]   Location [facility] LABOR & DELIVERY Attending Chanel Patricia DO   Hosp Day # 1 PCP None Pcp     Date of Procedure: 24     Preoperative Diagnosis:   1) Intrauterine pregnancy at 36w1d   2)  premature rupture of membranes  3) Di/di twin pregnancy  4) Breech presentation of presenting twin  5) Request for permanent sterilization    Postoperative Diagnosis: Same - Delivered    Procedure: Primary Low Transverse  Section, bilateral salpingectomy    Surgeon: Siria Mcnulty MD       Assistant:  Veronica Barrera MD    Findings: Normal uterus, bilateral fallopian tubes, and bilateral ovaries. Clear amniotic fluid. Twin A live male infant in breech presentation with weight:  2510g, APGARs 8/9. Twin B live male infant in vertex presentation with weight 2130g, APGARs 8/9    Anesthesia: spinal      QBL:  see delivery documentation, approx 600 mL    Procedure:   The patient was positioned supine on the operating table with a left lateral tilt. Sequential compression devices were applied. A Will catheter had been placed to drain the bladder. IV antibiotics were administered. The patient was prepped and draped in the usual sterile fashion. A time out was performed. After adequate level of anesthesia was confirmed, a Pfannenstiel incision was made in the skin and extended into the subcutaneous tissue. The fascia was scored and this incision extended laterally bilaterally with Watson scissors. The fascia was grasped with Kocher clamps and the underlying rectus muscles were dissected off superiorly and inferiorly.  The peritoneal cavity was entered bluntly. An Fidencio O-ring was placed for retraction.    The vesicouterine peritoneum was incised with Rehoboth scissors then a bladder flap was developed. A transverse incision was made in the lower uterine segment. Twin A was delivered from the  breech presentation using standard maneuvers. The cord was clamped and cut after 60 seconds then the infant was placed in the radiant warmer with Neonatology present. Cord blood was obtained. The amniotic sac for twin B was then ruptured and twin B delivered from the vertex presentation. The cord was clamped and cut after 60 seconds then the infant was placed in the radiant warmer with Neonatology present. The placentas were delivered intact with a three vessel cords. The uterine cavity was swept clean using a wet lap. The hysterotomy was closed in 2 layers using #1 Vicryl.     The left fallopian tube was grasped at the fimbriated end with Davenport clamps. The Enseal tissue sealing device was then used to make an incision along the mesosalpinx and across the left cornua to resect the tube. The right fallopian tube was excised in a similar fashion. The tubes were handed off to pathology.    The hysterotomy, peritoneum, and rectus muscles were inspected and found to be hemostatic. The peritoneum was closed with 2-0 vicryl. The fascia was closed with 0-Vicryl. The subcutaneous tissue was irrigated and any bleeding dessicated with the Bovie. The subcutaneous tissue was closed using 2-0 plain gut. The skin was closed with 3-0 Monocryl. The sponge and instrument counts were reported correct. The patient tolerated the procedure and was stable to the recovery room.     Specimen: cord blood, placentas  Drains: urinary Will catheter   Condition:  stable  Complications: None    Siria Mcnulty MD  5/17/2024  1:22 AM

## 2024-05-18 LAB
BASOPHILS # BLD AUTO: 0.03 X10(3) UL (ref 0–0.2)
BASOPHILS NFR BLD AUTO: 0.3 %
EOSINOPHIL # BLD AUTO: 0.06 X10(3) UL (ref 0–0.7)
EOSINOPHIL NFR BLD AUTO: 0.7 %
ERYTHROCYTE [DISTWIDTH] IN BLOOD BY AUTOMATED COUNT: 18.3 %
HCT VFR BLD AUTO: 28.8 %
HGB BLD-MCNC: 8.7 G/DL
IMM GRANULOCYTES # BLD AUTO: 0.02 X10(3) UL (ref 0–1)
IMM GRANULOCYTES NFR BLD: 0.2 %
LYMPHOCYTES # BLD AUTO: 3.24 X10(3) UL (ref 1–4)
LYMPHOCYTES NFR BLD AUTO: 37.3 %
MCH RBC QN AUTO: 22.9 PG (ref 26–34)
MCHC RBC AUTO-ENTMCNC: 30.2 G/DL (ref 31–37)
MCV RBC AUTO: 75.8 FL
MONOCYTES # BLD AUTO: 0.46 X10(3) UL (ref 0.1–1)
MONOCYTES NFR BLD AUTO: 5.3 %
NEUTROPHILS # BLD AUTO: 4.87 X10 (3) UL (ref 1.5–7.7)
NEUTROPHILS # BLD AUTO: 4.87 X10(3) UL (ref 1.5–7.7)
NEUTROPHILS NFR BLD AUTO: 56.2 %
PLATELET # BLD AUTO: 172 10(3)UL (ref 150–450)
PLATELETS.RETICULATED NFR BLD AUTO: 12 % (ref 0–7)
RBC # BLD AUTO: 3.8 X10(6)UL
WBC # BLD AUTO: 8.7 X10(3) UL (ref 4–11)

## 2024-05-18 PROCEDURE — 85025 COMPLETE CBC W/AUTO DIFF WBC: CPT | Performed by: OBSTETRICS & GYNECOLOGY

## 2024-05-18 NOTE — PLAN OF CARE

## 2024-05-18 NOTE — PROGRESS NOTES
[facility] 2SW-J [facility]    Tana Kwong Patient Status:  Inpatient   Age/Gender 24 year old female MRN [id number]   Location [facility] 2SW-J Attending Chanel Patricia DO   Hosp Day # 2 PCP None Pcp      Anesthesia Pain Progress Note    Anesthesia Technique:   Spinal Anesthesia     Pain Management Technique:  In addition to available oral supplemental and IV medications  Patient received neuraxial preservative free morphine for post procedural pain control.    Post Procedure Pain Quality:    Adequate    Pain Management Side Effects:  None     /64 (BP Location: Left arm)   Pulse 69   Temp 98.7 °F (37.1 °C) (Oral)   Resp 15   Ht 1.499 m (4' 11\")   Wt 78 kg (172 lb)   LMP 2023 (Exact Date)   SpO2 99%   Breastfeeding No   BMI 34.74 kg/m²       Injection Site: Injection site is intact on inspection     Complications from Pain Management or Anesthesia:   None    All patient questions were answered.  Follow up pain management is separate from intraoperative anesthetic needs.  Pain care is transitioned to primary service, with management by oral medications.    Thank you for asking us to participate in the care of your patient.    Jordan Mcfarland MD, 24, 9:40 AM      Jordan Mcfarland MD, 24, 9:40 AM

## 2024-05-18 NOTE — PROGRESS NOTES
POD#1    Patient has no complaints    /64 (BP Location: Left arm)   Pulse 69   Temp 98.7 °F (37.1 °C) (Oral)   Resp 15   Ht 59\"   Wt 172 lb (78 kg)   LMP 09/07/2023 (Exact Date)   SpO2 99%   Breastfeeding Yes   BMI 34.74 kg/m²     Recent Labs   Lab 05/16/24  2328   RBC 4.33   HGB 10.1*   HCT 32.6*   MCV 75.3*   MCH 23.3*   MCHC 31.0   RDW 18.2   NEPRELIM 3.55   WBC 6.5   .0       Abdomen: soft, NT/ND  Uterus: firm, below umbilicus  Incision: c/d/I    A/P: s/p LTCS  - doing well  - CBC pending, likely will need Venofer

## 2024-05-19 VITALS
BODY MASS INDEX: 34.68 KG/M2 | OXYGEN SATURATION: 99 % | DIASTOLIC BLOOD PRESSURE: 64 MMHG | WEIGHT: 172 LBS | RESPIRATION RATE: 16 BRPM | HEART RATE: 68 BPM | SYSTOLIC BLOOD PRESSURE: 117 MMHG | TEMPERATURE: 98 F | HEIGHT: 59 IN

## 2024-05-19 RX ORDER — GABAPENTIN 300 MG/1
300 CAPSULE ORAL EVERY 8 HOURS PRN
Qty: 30 CAPSULE | Refills: 0 | Status: SHIPPED | OUTPATIENT
Start: 2024-05-19

## 2024-05-19 RX ORDER — IBUPROFEN 600 MG/1
600 TABLET ORAL EVERY 6 HOURS
Qty: 40 TABLET | Refills: 0 | Status: SHIPPED | OUTPATIENT
Start: 2024-05-19

## 2024-05-19 RX ORDER — OXYCODONE HYDROCHLORIDE 5 MG/1
5 TABLET ORAL EVERY 4 HOURS PRN
Qty: 10 TABLET | Refills: 0 | Status: SHIPPED | OUTPATIENT
Start: 2024-05-19

## 2024-05-19 NOTE — PROGRESS NOTES
POD#2    Patient has no complaints    /69 (BP Location: Left arm)   Pulse 56   Temp 97.5 °F (36.4 °C) (Oral)   Resp 16   Ht 59\"   Wt 172 lb (78 kg)   LMP 09/07/2023 (Exact Date)   SpO2 99%   Breastfeeding No   BMI 34.74 kg/m²     Recent Labs   Lab 05/16/24  2328 05/18/24  1038   RBC 4.33 3.80   HGB 10.1* 8.7*   HCT 32.6* 28.8*   MCV 75.3* 75.8*   MCH 23.3* 22.9*   MCHC 31.0 30.2*   RDW 18.2 18.3   NEPRELIM 3.55 4.87   WBC 6.5 8.7   .0 172.0       Abdomen: soft, NT/ND  Uterus: firm, below umbilicus    A/P: s/p C/S, anemia  - received IV venofer X1 and plan repeat today  - doing well  - home tomorrow

## 2024-05-19 NOTE — PROGRESS NOTES
DISCHARGE NOTE  Discharge and follow-up appointment information reviewed with parents, no questions following.  ID Bands checked and verified at bedside. HUGS and Kisses tags removed  Babies in: car seats  Parent in wheelchair.   Escorted off unit by: RN x2

## 2024-05-19 NOTE — DISCHARGE SUMMARY
Admittion Date:5/16/24  Discharge Date:5/19/24  Diagnosis: IUP 36w1d, twins, labor, twin A breech  Procedure: LTCS  Surgeon:  Hospital stay: patient recovered without complications, received 2 doses of venofer for anemia, home on day 2 in stable condition, f/u 6 weeks

## 2024-05-19 NOTE — DISCHARGE INSTRUCTIONS
Post  Section Home Care Instructions     We hope you were pleased with your care at [facility].  We wish you the best outcome and overall experience with the delivery of your baby.  These instructions will help to minimize pain, limit the risk for an infection, and improve the likelihood of a successful recovery.    What to Expect:  Abdominal cramping after delivery especially if you are breastfeeding.   Vaginal bleeding for about 4-6 weeks that may be followed by a yellow or white discharge for a few more weeks.  Your period will resume in approximately 6-8 weeks, unless you are breastfeeding.    If you are bottle feeding, you may notice breast engorgement in about 3 days.  Your breast may be sore and hard. Please wear a tight fitted bra or sports bra for 24-36 hours to help prevent your breast from producing milk, and use ice packs to relive any discomfort.  If you are breastfeeding, nipple dryness is very common the first few days.    Constipation is common after having a baby.  Please increase fluid and fiber in your diet.      Over-The-Counter Medication  Non-prescription anti-inflammatory medications can also help to ease the pain.  You may take Aleve, Tylenol or Ibuprofen   Colace or Metamucil for Constipation  Lanolin for dry nipples  Tucks, Witch Hazel and Epifoam for Episiotomy discomfort.   Drink a full glass of water with oral medication and take as directed.    Wound Care  The following instructions will promote proper healing and help to prevent infection  Leave your Steri-Strips (butterfly tapes) in place as long as possible.  Do not remove the Steri-Strips  Do not replace the Steri-Strips, if they come off.  If the tapes come off, leave them off and keep the incision clean.  You do not need to cover the incision or put any medications on the incision.    Bathing/Showers  You may resume showers  No baths, swimming, hot tubs until your post-partum visit    Home Medication  Resume your  home medications as instructed    Diet  Resume your normal diet    Activity  Refrain from vaginal intercourse, vaginal suppositories, tampon use or douches until after your post-partum visit  No exercising for 4 weeks  You may climb stairs minimally for the 1st week.    Do not do heavy housework for at least 2-3 weeks    Return to Work or School  You may return to work in 6-8 weeks  Contact your obstetrician’s office, if you need a medical release.   Driving  Avoid driving for 1-2 weeks or sooner if not taking narcotics.    Follow-up Appointment with Your Obstetrician  Call your obstetrician’s office today for an appointment in:  2 & 6 weeks.   Verify your appointment date, day, time, and location.  At your 1st post-partum office visit:  Your progress will be evaluated, findings reviewed, and any additional concerns and instructions will be discussed.    Questions or Concerns  Call your obstetrician’s office if you experience the following:  Severe pain not controlled by pain medication  Too much pain when touched; yellowish, greenish, or bloody discharge, foul smelling vaginal discharge, cut site opens up  Heavy bleeding,problems with pain that does not go away or gets worse  Shortness of breath or sudden onset of chest pain  Fever of 100.4 F (38 C) or higher, chills, warmth around wound that will not heal  Redness, increased swelling or drainage from your incision  Crying and periods of sadness that prevents you from caring for yourself and your baby or you feel like harming yourself or baby.  Burning sensation during urination or inability to urinate or have bowel movements  Swelling, redness or abnormal warmth to your leg/calf  Swollen, hard, or painful breasts  You are not feeling better in 2 to 3 days, or are feeling increasingly worse  If your call is made after office hours, a physician will be available to help you.  There is always a provider covering our patients.                  MEDICATIONS offered/used  during your stay:    @ MOTRIN (Ibuprofin 600mg)   1 tab every 6 hours as needed for pain   Last taken at:   --> Next dose due at:       @ TYLENOL (Acetaminophen 500)   1-2 tabs, every 6 hours, as needed for increased pain.  Last taken at:   --> Next dose due at:    @ GABAPENTIN (Neurontin 300mg)  1 tab, every 8 hours as needed for pain  Last taken at:    --> Next dose due at:    @ ROXICODONE (Naloxone Oxycodone HCL 5mg)  1 tab every 6 hours as needed for increased pain  Last taken at:                            --> Next dose due at:    @ NORCO (Acetaminophen 325 / Hydrocodone 5mg)   1-2 tabs, every 6 hours, as needed for increased pain.  Last taken at:   --> Next dose due at:      @ COLACE (Docusate Sodium 100mg)  1 tab two times per day as needed for stool softening   (once in am/once in pm)  Last taken at:   --> Next dose due at:      @ SIMETHICONE (Mylicon 80mg) Chewable Tab   1 tab daily as needed for gas.  Last taken at:    @ DERMOPLAST (Pain Relieving Spray)   Use as needed for perineal discomfort    @ TUCKS (Witch-Alexandra Glycerin pads)   Use as needed for hemorrhoids and/or perineal discomfort    Please see your Parent-Infant instruction pamphlet in your white [place] folder for further reference/review/instructions.  Thank you for coming to [facility]. We hope you've enjoyed your experience here.

## 2024-05-21 ENCOUNTER — TELEPHONE (OUTPATIENT)
Dept: OBGYN UNIT | Facility: HOSPITAL | Age: 24
End: 2024-05-21

## 2024-05-21 NOTE — PROGRESS NOTES
Reviewed self and infant care w / mom, she verbalizes understanding of instructions reviewed. Encourage to follow up w/ MDs as directed and w/ questions/concerns. Bottlefding twin boys, appt tomorrow, no concerns now

## 2024-05-23 ENCOUNTER — PATIENT OUTREACH (OUTPATIENT)
Dept: CASE MANAGEMENT | Age: 24
End: 2024-05-23

## 2024-05-23 NOTE — PROGRESS NOTES
OBGYN Post Partum apt request (delivered 05/17)    Dr Siria Mcnulty  [facility]  [address] [place] [id number] [phone]  Follow up 6 weeks  Apt made:  Thsamara 06/27 @10:30am - Patrizia MCKENZIE w/apt information; if still need assistance to call [phone]   Closing encounter
